# Patient Record
Sex: MALE | Race: WHITE | NOT HISPANIC OR LATINO | Employment: OTHER | URBAN - METROPOLITAN AREA
[De-identification: names, ages, dates, MRNs, and addresses within clinical notes are randomized per-mention and may not be internally consistent; named-entity substitution may affect disease eponyms.]

---

## 2023-06-10 ENCOUNTER — HOSPITAL ENCOUNTER (EMERGENCY)
Facility: HOSPITAL | Age: 63
Discharge: HOME | End: 2023-06-11
Attending: EMERGENCY MEDICINE | Admitting: EMERGENCY MEDICINE
Payer: COMMERCIAL

## 2023-06-10 ENCOUNTER — APPOINTMENT (OUTPATIENT)
Dept: GENERAL RADIOLOGY | Facility: HOSPITAL | Age: 63
End: 2023-06-10
Payer: COMMERCIAL

## 2023-06-10 DIAGNOSIS — L03.115 CELLULITIS OF RIGHT LOWER EXTREMITY: Primary | ICD-10-CM

## 2023-06-10 DIAGNOSIS — Z89.511 S/P BKA (BELOW KNEE AMPUTATION) UNILATERAL, RIGHT: ICD-10-CM

## 2023-06-10 LAB
ALBUMIN SERPL-MCNC: 4.3 G/DL (ref 3.5–5.2)
ALBUMIN/GLOB SERPL: 1.2 G/DL
ALP SERPL-CCNC: 72 U/L (ref 39–117)
ALT SERPL W P-5'-P-CCNC: 14 U/L (ref 1–41)
ANION GAP SERPL CALCULATED.3IONS-SCNC: 10 MMOL/L (ref 5–15)
AST SERPL-CCNC: 13 U/L (ref 1–40)
BASOPHILS # BLD AUTO: 0.03 10*3/MM3 (ref 0–0.2)
BASOPHILS NFR BLD AUTO: 0.2 % (ref 0–1.5)
BILIRUB SERPL-MCNC: 0.5 MG/DL (ref 0–1.2)
BUN SERPL-MCNC: 10 MG/DL (ref 8–23)
BUN/CREAT SERPL: 12.8 (ref 7–25)
CALCIUM SPEC-SCNC: 8.9 MG/DL (ref 8.6–10.5)
CHLORIDE SERPL-SCNC: 97 MMOL/L (ref 98–107)
CO2 SERPL-SCNC: 27 MMOL/L (ref 22–29)
CREAT SERPL-MCNC: 0.78 MG/DL (ref 0.76–1.27)
CRP SERPL-MCNC: 18.86 MG/DL (ref 0–0.5)
D-LACTATE SERPL-SCNC: 1 MMOL/L (ref 0.5–2)
DEPRECATED RDW RBC AUTO: 46.8 FL (ref 37–54)
EGFRCR SERPLBLD CKD-EPI 2021: 100.2 ML/MIN/1.73
EOSINOPHIL # BLD AUTO: 0.03 10*3/MM3 (ref 0–0.4)
EOSINOPHIL NFR BLD AUTO: 0.2 % (ref 0.3–6.2)
ERYTHROCYTE [DISTWIDTH] IN BLOOD BY AUTOMATED COUNT: 14.2 % (ref 12.3–15.4)
ERYTHROCYTE [SEDIMENTATION RATE] IN BLOOD: 19 MM/HR (ref 0–20)
GLOBULIN UR ELPH-MCNC: 3.5 GM/DL
GLUCOSE SERPL-MCNC: 169 MG/DL (ref 65–99)
HCT VFR BLD AUTO: 41 % (ref 37.5–51)
HGB BLD-MCNC: 13.9 G/DL (ref 13–17.7)
IMM GRANULOCYTES # BLD AUTO: 0.05 10*3/MM3 (ref 0–0.05)
IMM GRANULOCYTES NFR BLD AUTO: 0.3 % (ref 0–0.5)
LYMPHOCYTES # BLD AUTO: 2.48 10*3/MM3 (ref 0.7–3.1)
LYMPHOCYTES NFR BLD AUTO: 17.2 % (ref 19.6–45.3)
MCH RBC QN AUTO: 30.6 PG (ref 26.6–33)
MCHC RBC AUTO-ENTMCNC: 33.9 G/DL (ref 31.5–35.7)
MCV RBC AUTO: 90.3 FL (ref 79–97)
MONOCYTES # BLD AUTO: 1.52 10*3/MM3 (ref 0.1–0.9)
MONOCYTES NFR BLD AUTO: 10.6 % (ref 5–12)
NEUTROPHILS NFR BLD AUTO: 10.28 10*3/MM3 (ref 1.7–7)
NEUTROPHILS NFR BLD AUTO: 71.5 % (ref 42.7–76)
NRBC BLD AUTO-RTO: 0 /100 WBC (ref 0–0.2)
PLATELET # BLD AUTO: 146 10*3/MM3 (ref 140–450)
PMV BLD AUTO: 9.4 FL (ref 6–12)
POTASSIUM SERPL-SCNC: 3.2 MMOL/L (ref 3.5–5.2)
PROT SERPL-MCNC: 7.8 G/DL (ref 6–8.5)
RBC # BLD AUTO: 4.54 10*6/MM3 (ref 4.14–5.8)
SODIUM SERPL-SCNC: 134 MMOL/L (ref 136–145)
WBC NRBC COR # BLD: 14.39 10*3/MM3 (ref 3.4–10.8)

## 2023-06-10 PROCEDURE — 73560 X-RAY EXAM OF KNEE 1 OR 2: CPT

## 2023-06-10 PROCEDURE — 80053 COMPREHEN METABOLIC PANEL: CPT | Performed by: EMERGENCY MEDICINE

## 2023-06-10 PROCEDURE — 87040 BLOOD CULTURE FOR BACTERIA: CPT | Performed by: EMERGENCY MEDICINE

## 2023-06-10 PROCEDURE — 96365 THER/PROPH/DIAG IV INF INIT: CPT

## 2023-06-10 PROCEDURE — 83605 ASSAY OF LACTIC ACID: CPT | Performed by: EMERGENCY MEDICINE

## 2023-06-10 PROCEDURE — 85652 RBC SED RATE AUTOMATED: CPT | Performed by: EMERGENCY MEDICINE

## 2023-06-10 PROCEDURE — 25010000002 VANCOMYCIN 10 G RECONSTITUTED SOLUTION: Performed by: EMERGENCY MEDICINE

## 2023-06-10 PROCEDURE — G0378 HOSPITAL OBSERVATION PER HR: HCPCS

## 2023-06-10 PROCEDURE — 99285 EMERGENCY DEPT VISIT HI MDM: CPT

## 2023-06-10 PROCEDURE — 96367 TX/PROPH/DG ADDL SEQ IV INF: CPT

## 2023-06-10 PROCEDURE — 25010000002 CEFTRIAXONE PER 250 MG: Performed by: EMERGENCY MEDICINE

## 2023-06-10 PROCEDURE — 86140 C-REACTIVE PROTEIN: CPT | Performed by: EMERGENCY MEDICINE

## 2023-06-10 PROCEDURE — 36415 COLL VENOUS BLD VENIPUNCTURE: CPT

## 2023-06-10 PROCEDURE — 85025 COMPLETE CBC W/AUTO DIFF WBC: CPT | Performed by: EMERGENCY MEDICINE

## 2023-06-10 RX ORDER — ACETAMINOPHEN 500 MG
1000 TABLET ORAL ONCE
Status: COMPLETED | OUTPATIENT
Start: 2023-06-10 | End: 2023-06-10

## 2023-06-10 RX ADMIN — CEFTRIAXONE 2 G: 2 INJECTION, POWDER, FOR SOLUTION INTRAMUSCULAR; INTRAVENOUS at 23:04

## 2023-06-10 RX ADMIN — ACETAMINOPHEN 1000 MG: 500 TABLET, FILM COATED ORAL at 23:01

## 2023-06-10 RX ADMIN — VANCOMYCIN HYDROCHLORIDE 1500 MG: 10 INJECTION, POWDER, LYOPHILIZED, FOR SOLUTION INTRAVENOUS at 23:43

## 2023-06-10 NOTE — ED NOTES
Mechanical fall 2 days ago, hit R knee/stump on desk during fall. Redness, pain, and swelling. Pt has R BKA. Small wound noted, but looks like it is healing well, no drainage present.

## 2023-06-10 NOTE — Clinical Note
Level of Care: Med/Surg [1]   Diagnosis: Cellulitis of right lower extremity [[id number]]   Admitting Physician: BRUNA HUTCHINSON [[id number]]   Attending Physician: BRUNA HUTCHINSON [[id number]]

## 2023-06-11 VITALS
WEIGHT: 175 LBS | RESPIRATION RATE: 18 BRPM | HEART RATE: 74 BPM | BODY MASS INDEX: 25.05 KG/M2 | SYSTOLIC BLOOD PRESSURE: 107 MMHG | HEIGHT: 70 IN | OXYGEN SATURATION: 100 % | TEMPERATURE: 100 F | DIASTOLIC BLOOD PRESSURE: 78 MMHG

## 2023-06-11 PROCEDURE — G0378 HOSPITAL OBSERVATION PER HR: HCPCS

## 2023-06-11 PROCEDURE — 96366 THER/PROPH/DIAG IV INF ADDON: CPT

## 2023-06-11 RX ORDER — INSULIN DEGLUDEC INJECTION 100 U/ML
INJECTION, SOLUTION SUBCUTANEOUS
COMMUNITY
Start: 2023-03-31

## 2023-06-11 RX ORDER — CYCLOBENZAPRINE HCL 5 MG
5 TABLET ORAL 3 TIMES DAILY PRN
COMMUNITY
Start: 2023-04-19

## 2023-06-11 RX ORDER — AMITRIPTYLINE HYDROCHLORIDE 25 MG/1
25 TABLET, FILM COATED ORAL NIGHTLY PRN
COMMUNITY
Start: 2023-05-16

## 2023-06-11 RX ORDER — ATORVASTATIN CALCIUM 80 MG/1
1 TABLET, FILM COATED ORAL DAILY
COMMUNITY
Start: 2023-05-19

## 2023-06-11 RX ORDER — GLIPIZIDE 10 MG/1
1 TABLET, FILM COATED, EXTENDED RELEASE ORAL DAILY
COMMUNITY
Start: 2023-03-24

## 2023-06-11 RX ORDER — DULAGLUTIDE 1.5 MG/.5ML
INJECTION, SOLUTION SUBCUTANEOUS
COMMUNITY
Start: 2023-04-20

## 2023-06-11 NOTE — ED NOTES
Pt not found in room when checking for hourly rounding. Pt not found in near by restroom. Pt reportedly seen by ED triage staff leaving the department through the main ED entrance. Pt did not inform staff of his departure. Pt presumed to have eloped. Pt did not answer phone call x2. Pt's IV status unknown at time of elopement.

## 2023-06-11 NOTE — ED NOTES
Pt found by ED triage staff out at waiting room vending machines. Pt instructed to return to his room.

## 2023-06-11 NOTE — ED PROVIDER NOTES
EMERGENCY DEPARTMENT ENCOUNTER    Room Number:  33/33  PCP: Junior Murray MD  Historian: Patient      HPI:  Chief Complaint: Right leg pain and swelling  A complete HPI/ROS/PMH/PSH/SH/FH are unobtainable due to: Nothing  Context: Ajit Brandt is a 62 y.o. male who presents to the ED c/o pain and swelling of his right BKA stump.  Patient reports that he fell on Wednesday and thinks that he may have hit it then.  He denies any open wound related to that fall.  Since that he has developed some redness and swelling.  The swelling has caused some skin irritation from his prosthesis.  He denies fever or chills however he was noted to have a fever here on arrival.            PAST MEDICAL HISTORY  Active Ambulatory Problems     Diagnosis Date Noted    No Active Ambulatory Problems     Resolved Ambulatory Problems     Diagnosis Date Noted    No Resolved Ambulatory Problems     No Additional Past Medical History         PAST SURGICAL HISTORY  No past surgical history on file.      FAMILY HISTORY  No family history on file.      SOCIAL HISTORY  Social History     Socioeconomic History    Marital status:          ALLERGIES  Patient has no known allergies.        REVIEW OF SYSTEMS  Review of Systems   Review of all 14 systems is negative other than stated in the HPI above.      PHYSICAL EXAM  ED Triage Vitals   Temp Heart Rate Resp BP SpO2   06/10/23 1933 06/10/23 1931 06/10/23 1931 06/10/23 1936 06/10/23 1931   (!) 100.7 °F (38.2 °C) 112 18 99/73 96 %      Temp src Heart Rate Source Patient Position BP Location FiO2 (%)   06/10/23 1933 -- 06/10/23 1936 06/10/23 1936 --   Tympanic  Sitting Right arm        Physical Exam      GENERAL: Awake and alert, no acute distress  HENT: nares patent  EYES: no scleral icterus, EOMI  CV: regular rhythm, normal rate  RESPIRATORY: normal effort  ABDOMEN: soft, nondistended  MUSCULOSKELETAL: Status post right BKA.  There is erythema, induration, tenderness, warmth particular over  the lateral aspect of the BKA stump.  There is some erythema that tracks proximally.  There are no open wounds.  There is some dried and cracked skin present over the lateral aspect of the BKA stump.  No vesicles or bullae.  No soft tissue crepitus.  NEURO: alert, moves all extremities, follows commands  PSYCH:  calm, cooperative  SKIN: warm, dry, erythema and warmth present over the right BKA stump as described above    Vital signs and nursing notes reviewed.          LAB RESULTS  Recent Results (from the past 24 hour(s))   Lactic Acid, Plasma    Collection Time: 06/10/23  9:41 PM    Specimen: Blood   Result Value Ref Range    Lactate 1.0 0.5 - 2.0 mmol/L   Sedimentation Rate    Collection Time: 06/10/23  9:41 PM    Specimen: Blood   Result Value Ref Range    Sed Rate 19 0 - 20 mm/hr   C-reactive Protein    Collection Time: 06/10/23  9:41 PM    Specimen: Blood   Result Value Ref Range    C-Reactive Protein 18.86 (H) 0.00 - 0.50 mg/dL   Comprehensive Metabolic Panel    Collection Time: 06/10/23  9:41 PM    Specimen: Blood   Result Value Ref Range    Glucose 169 (H) 65 - 99 mg/dL    BUN 10 8 - 23 mg/dL    Creatinine 0.78 0.76 - 1.27 mg/dL    Sodium 134 (L) 136 - 145 mmol/L    Potassium 3.2 (L) 3.5 - 5.2 mmol/L    Chloride 97 (L) 98 - 107 mmol/L    CO2 27.0 22.0 - 29.0 mmol/L    Calcium 8.9 8.6 - 10.5 mg/dL    Total Protein 7.8 6.0 - 8.5 g/dL    Albumin 4.3 3.5 - 5.2 g/dL    ALT (SGPT) 14 1 - 41 U/L    AST (SGOT) 13 1 - 40 U/L    Alkaline Phosphatase 72 39 - 117 U/L    Total Bilirubin 0.5 0.0 - 1.2 mg/dL    Globulin 3.5 gm/dL    A/G Ratio 1.2 g/dL    BUN/Creatinine Ratio 12.8 7.0 - 25.0    Anion Gap 10.0 5.0 - 15.0 mmol/L    eGFR 100.2 >60.0 mL/min/1.73   CBC Auto Differential    Collection Time: 06/10/23  9:41 PM    Specimen: Blood   Result Value Ref Range    WBC 14.39 (H) 3.40 - 10.80 10*3/mm3    RBC 4.54 4.14 - 5.80 10*6/mm3    Hemoglobin 13.9 13.0 - 17.7 g/dL    Hematocrit 41.0 37.5 - 51.0 %    MCV 90.3 79.0 -  97.0 fL    MCH 30.6 26.6 - 33.0 pg    MCHC 33.9 31.5 - 35.7 g/dL    RDW 14.2 12.3 - 15.4 %    RDW-SD 46.8 37.0 - 54.0 fl    MPV 9.4 6.0 - 12.0 fL    Platelets 146 140 - 450 10*3/mm3    Neutrophil % 71.5 42.7 - 76.0 %    Lymphocyte % 17.2 (L) 19.6 - 45.3 %    Monocyte % 10.6 5.0 - 12.0 %    Eosinophil % 0.2 (L) 0.3 - 6.2 %    Basophil % 0.2 0.0 - 1.5 %    Immature Grans % 0.3 0.0 - 0.5 %    Neutrophils, Absolute 10.28 (H) 1.70 - 7.00 10*3/mm3    Lymphocytes, Absolute 2.48 0.70 - 3.10 10*3/mm3    Monocytes, Absolute 1.52 (H) 0.10 - 0.90 10*3/mm3    Eosinophils, Absolute 0.03 0.00 - 0.40 10*3/mm3    Basophils, Absolute 0.03 0.00 - 0.20 10*3/mm3    Immature Grans, Absolute 0.05 0.00 - 0.05 10*3/mm3    nRBC 0.0 0.0 - 0.2 /100 WBC       Ordered the above labs and reviewed the results.        RADIOLOGY  XR Knee 1 or 2 View Right    Result Date: 6/10/2023  2 VIEWS RIGHT KNEE  HISTORY: Right below knee amputation site redness  COMPARISON: None available.  FINDINGS: The patient is status post right below knee amputation. No definite aggressive osseous abnormalities are seen to suggest osteomyelitis. No acute fracture or subluxation is seen. There is no supra patellar effusion. There are dense vascular calcifications.      No acute osseous findings.  This report was finalized on 6/10/2023 9:56 PM by Dr. Carolyn Alan M.D.       Ordered the above noted radiological studies. Reviewed by me in PACS.            PROCEDURES  Procedures              MEDICATIONS GIVEN IN ER  Medications   acetaminophen (TYLENOL) tablet 1,000 mg (1,000 mg Oral Given 6/10/23 2301)   cefTRIAXone (ROCEPHIN) 2 g in sodium chloride 0.9 % 100 mL IVPB-VTB (0 g Intravenous Stopped 6/10/23 7723)   vancomycin 1500 mg/500 mL 0.9% NS IVPB ([facility]) (0 mg Intravenous Stopped 6/11/23 0141)                   MEDICAL DECISION MAKING, PROGRESS, and CONSULTS    All labs have been independently reviewed by me.  All radiology studies have been reviewed by me and I have  also reviewed the radiology report.   EKG's independently viewed and interpreted by me.  Discussion below represents my analysis of pertinent findings related to patient's condition, differential diagnosis, treatment plan and final disposition.      Additional sources:  - Discussed/ obtained information from independent historians: N/A    - External (non-ED) record review: N/A    - Chronic or social conditions impacting care: N/A    - Shared decision making: Patient informed of plan for admission for IV antibiotics due to right BKA stump cellulitis.      Orders placed during this visit:  Orders Placed This Encounter   Procedures    Blood Culture - Blood,    Blood Culture - Blood,    XR Knee 1 or 2 View Right    Lactic Acid, Plasma    Sedimentation Rate    C-reactive Protein    Comprehensive Metabolic Panel    CBC Auto Differential    Initiate Observation Status    CBC & Differential           Differential diagnosis includes but is not limited to:    Cellulitis  Osteomyelitis  Sepsis        Independent interpretation of labs, radiology studies, and discussions with consultants:  ED Course as of 06/11/23 0319   Sat Sergei 10, 2023   2210 C-Reactive Protein(!): 18.86 [JR]   2210 Lactate: 1.0 [JR]   2210 Sed Rate: 19 [JR]   2210 Plain films of the right knee independently interpreted in PACS.  Patient is status post right BKA.  There is no evidence of acute fracture. [JR]   2216 WBC(!): 14.39 [JR]   2250 Sodium(!): 134 [JR]   2250 Potassium(!): 3.2 [JR]   2323 Discussed with Dr. Overton who agrees to admit patient for further management of right lower extremity cellulitis. [JR]      ED Course User Index  [JR] Jorge Sanchez MD                 DIAGNOSIS  Final diagnoses:   Cellulitis of right lower extremity   S/P BKA (below knee amputation) unilateral, right         DISPOSITION  Admit            Latest Documented Vital Signs:  As of 03:19 EDT  BP- 107/78 HR- 74 Temp- 100 °F (37.8 °C) (Oral) O2 sat- 100%               --    Please note that portions of this were completed with a voice recognition program.       Note Disclaimer: At [facility], we believe that sharing information builds trust and better relationships. You are receiving this note because you are receiving care at [facility] or recently visited. It is possible you will see health information before a provider has talked with you about it. This kind of information can be easy to misunderstand. To help you fully understand what it means for your health, we urge you to discuss this note with your provider.             Jorge Sanchez MD  06/11/23 5029

## 2023-06-15 LAB
BACTERIA SPEC AEROBE CULT: NORMAL
BACTERIA SPEC AEROBE CULT: NORMAL

## 2024-08-20 ENCOUNTER — APPOINTMENT (OUTPATIENT)
Dept: GENERAL RADIOLOGY | Facility: HOSPITAL | Age: 64
End: 2024-08-20
Payer: COMMERCIAL

## 2024-08-20 ENCOUNTER — HOSPITAL ENCOUNTER (OUTPATIENT)
Facility: HOSPITAL | Age: 64
Setting detail: OBSERVATION
Discharge: HOME | End: 2024-08-22
Attending: EMERGENCY MEDICINE | Admitting: EMERGENCY MEDICINE
Payer: COMMERCIAL

## 2024-08-20 DIAGNOSIS — I25.10 CORONARY ARTERY DISEASE INVOLVING NATIVE CORONARY ARTERY OF NATIVE HEART, UNSPECIFIED WHETHER ANGINA PRESENT: ICD-10-CM

## 2024-08-20 DIAGNOSIS — R07.9 CHEST PAIN, UNSPECIFIED TYPE: Primary | ICD-10-CM

## 2024-08-20 DIAGNOSIS — E11.65 UNCONTROLLED TYPE 2 DIABETES MELLITUS WITH HYPERGLYCEMIA: ICD-10-CM

## 2024-08-20 PROCEDURE — 85025 COMPLETE CBC W/AUTO DIFF WBC: CPT | Performed by: EMERGENCY MEDICINE

## 2024-08-20 PROCEDURE — 93005 ELECTROCARDIOGRAM TRACING: CPT | Performed by: EMERGENCY MEDICINE

## 2024-08-20 PROCEDURE — 80053 COMPREHEN METABOLIC PANEL: CPT | Performed by: EMERGENCY MEDICINE

## 2024-08-20 PROCEDURE — 71045 X-RAY EXAM CHEST 1 VIEW: CPT

## 2024-08-20 PROCEDURE — 84484 ASSAY OF TROPONIN QUANT: CPT | Performed by: EMERGENCY MEDICINE

## 2024-08-20 PROCEDURE — 83880 ASSAY OF NATRIURETIC PEPTIDE: CPT | Performed by: EMERGENCY MEDICINE

## 2024-08-20 PROCEDURE — 93010 ELECTROCARDIOGRAM REPORT: CPT | Performed by: INTERNAL MEDICINE

## 2024-08-20 RX ORDER — SODIUM CHLORIDE 0.9 % (FLUSH) 0.9 %
10 SYRINGE (ML) INJECTION AS NEEDED
Status: DISCONTINUED | OUTPATIENT
Start: 2024-08-20 | End: 2024-08-22 | Stop reason: HOSPADM

## 2024-08-20 NOTE — LETTER
[facility]  [phone]    ACCESS CENTER STATEMENT OF DISPOSITION    I, Ajit Brandt, was assessed in the [facility] at [facility] on 8/22/2024.  I understand the recommendations below and what follow-up action is expected of me.    [facility]  [phone]    [facility]  [phone]    [facility].  [phone]  [email or web address]    [facility]rief Therapists  www.firsthourgrief.org  *under “Resources” - Counseling and Support Groups    Grief Share  www.griefshare.org (can locate grief groups in [place] and other info)    Jona Elam  (in person and online counseling)  [address] [facility] [address] [facility] [address], [place] [address] [phone]    Gigi Lara LCSW (in person and online counseling)  [phone]    ________________________________  Patient/Parent/Guardian/POA Signature    ________________________________    8/22/2024  09:10 EDT

## 2024-08-21 ENCOUNTER — APPOINTMENT (OUTPATIENT)
Dept: NUCLEAR MEDICINE | Facility: HOSPITAL | Age: 64
End: 2024-08-21
Payer: COMMERCIAL

## 2024-08-21 ENCOUNTER — APPOINTMENT (OUTPATIENT)
Dept: CARDIOLOGY | Facility: HOSPITAL | Age: 64
End: 2024-08-21
Payer: COMMERCIAL

## 2024-08-21 PROBLEM — R07.9 CHEST PAIN: Status: ACTIVE | Noted: 2024-08-21

## 2024-08-21 LAB
ALBUMIN SERPL-MCNC: 3.6 G/DL (ref 3.5–5.2)
ALBUMIN SERPL-MCNC: 3.9 G/DL (ref 3.5–5.2)
ALBUMIN/GLOB SERPL: 1.3 G/DL
ALBUMIN/GLOB SERPL: 1.6 G/DL
ALP SERPL-CCNC: 73 U/L (ref 39–117)
ALP SERPL-CCNC: 77 U/L (ref 39–117)
ALT SERPL W P-5'-P-CCNC: 10 U/L (ref 1–41)
ALT SERPL W P-5'-P-CCNC: 12 U/L (ref 1–41)
AMPHET+METHAMPHET UR QL: NEGATIVE
ANION GAP SERPL CALCULATED.3IONS-SCNC: 7 MMOL/L (ref 5–15)
ANION GAP SERPL CALCULATED.3IONS-SCNC: 7.9 MMOL/L (ref 5–15)
AORTIC DIMENSIONLESS INDEX: 0.4 (DI)
AST SERPL-CCNC: 11 U/L (ref 1–40)
AST SERPL-CCNC: 13 U/L (ref 1–40)
BARBITURATES UR QL SCN: NEGATIVE
BASOPHILS # BLD AUTO: 0.06 10*3/MM3 (ref 0–0.2)
BASOPHILS NFR BLD AUTO: 0.7 % (ref 0–1.5)
BENZODIAZ UR QL SCN: NEGATIVE
BH CV ECHO MEAS - AO MAX PG: 18.9 MMHG
BH CV ECHO MEAS - AO MEAN PG: 14.9 MMHG
BH CV ECHO MEAS - AO ROOT DIAM: 3.7 CM
BH CV ECHO MEAS - AO V2 MAX: 217.7 CM/SEC
BH CV ECHO MEAS - AO V2 VTI: 48.7 CM
BH CV ECHO MEAS - AVA(I,D): 0.97 CM2
BH CV ECHO MEAS - EDV(CUBED): 84.4 ML
BH CV ECHO MEAS - EDV(MOD-SP2): 74 ML
BH CV ECHO MEAS - EDV(MOD-SP4): 67 ML
BH CV ECHO MEAS - EF(MOD-BP): 58.8 %
BH CV ECHO MEAS - EF(MOD-SP2): 58.1 %
BH CV ECHO MEAS - EF(MOD-SP4): 59.7 %
BH CV ECHO MEAS - ESV(CUBED): 18.5 ML
BH CV ECHO MEAS - ESV(MOD-SP2): 31 ML
BH CV ECHO MEAS - ESV(MOD-SP4): 27 ML
BH CV ECHO MEAS - FS: 39.7 %
BH CV ECHO MEAS - IVS/LVPW: 0.95 CM
BH CV ECHO MEAS - IVSD: 0.69 CM
BH CV ECHO MEAS - LAT PEAK E' VEL: 8.6 CM/SEC
BH CV ECHO MEAS - LV DIASTOLIC VOL/BSA (35-75): 37 CM2
BH CV ECHO MEAS - LV MASS(C)D: 92.8 GRAMS
BH CV ECHO MEAS - LV MAX PG: 2.32 MMHG
BH CV ECHO MEAS - LV MEAN PG: 1.28 MMHG
BH CV ECHO MEAS - LV SYSTOLIC VOL/BSA (12-30): 14.9 CM2
BH CV ECHO MEAS - LV V1 MAX: 76.2 CM/SEC
BH CV ECHO MEAS - LV V1 VTI: 14.9 CM
BH CV ECHO MEAS - LVIDD: 4.4 CM
BH CV ECHO MEAS - LVIDS: 2.6 CM
BH CV ECHO MEAS - LVOT AREA: 3.2 CM2
BH CV ECHO MEAS - LVOT DIAM: 2.01 CM
BH CV ECHO MEAS - LVPWD: 0.73 CM
BH CV ECHO MEAS - MED PEAK E' VEL: 6.9 CM/SEC
BH CV ECHO MEAS - MV A DUR: 0.11 SEC
BH CV ECHO MEAS - MV A MAX VEL: 76 CM/SEC
BH CV ECHO MEAS - MV DEC SLOPE: 297.6 CM/SEC2
BH CV ECHO MEAS - MV DEC TIME: 0.16 SEC
BH CV ECHO MEAS - MV E MAX VEL: 60.8 CM/SEC
BH CV ECHO MEAS - MV E/A: 0.8
BH CV ECHO MEAS - MV MAX PG: 3 MMHG
BH CV ECHO MEAS - MV MEAN PG: 1.04 MMHG
BH CV ECHO MEAS - MV P1/2T: 70.2 MSEC
BH CV ECHO MEAS - MV V2 VTI: 22.2 CM
BH CV ECHO MEAS - MVA(P1/2T): 3.1 CM2
BH CV ECHO MEAS - MVA(VTI): 2.13 CM2
BH CV ECHO MEAS - PA ACC TIME: 0.13 SEC
BH CV ECHO MEAS - PA V2 MAX: 98.4 CM/SEC
BH CV ECHO MEAS - PULM A REVS DUR: 0.14 SEC
BH CV ECHO MEAS - PULM A REVS VEL: 27.1 CM/SEC
BH CV ECHO MEAS - PULM DIAS VEL: 38.7 CM/SEC
BH CV ECHO MEAS - PULM S/D: 1.31
BH CV ECHO MEAS - PULM SYS VEL: 50.8 CM/SEC
BH CV ECHO MEAS - RAP SYSTOLE: 3 MMHG
BH CV ECHO MEAS - RV MAX PG: 3.9 MMHG
BH CV ECHO MEAS - RV V1 MAX: 98.5 CM/SEC
BH CV ECHO MEAS - RV V1 VTI: 16.2 CM
BH CV ECHO MEAS - RVSP: 15.3 MMHG
BH CV ECHO MEAS - SV(LVOT): 47.2 ML
BH CV ECHO MEAS - SV(MOD-SP2): 43 ML
BH CV ECHO MEAS - SV(MOD-SP4): 40 ML
BH CV ECHO MEAS - SVI(LVOT): 26.1 ML/M2
BH CV ECHO MEAS - SVI(MOD-SP2): 23.8 ML/M2
BH CV ECHO MEAS - SVI(MOD-SP4): 22.1 ML/M2
BH CV ECHO MEAS - TAPSE (>1.6): 2.5 CM
BH CV ECHO MEAS - TR MAX PG: 12.3 MMHG
BH CV ECHO MEAS - TR MAX VEL: 175.4 CM/SEC
BH CV ECHO MEASUREMENTS AVERAGE E/E' RATIO: 7.85
BH CV REST NUCLEAR ISOTOPE DOSE: 10.2 MCI
BH CV STRESS COMMENTS STAGE 1: NORMAL
BH CV STRESS DOSE REGADENOSON STAGE 1: 0.4
BH CV STRESS DURATION MIN STAGE 1: 0
BH CV STRESS DURATION SEC STAGE 1: 10
BH CV STRESS NUCLEAR ISOTOPE DOSE: 29.4 MCI
BH CV STRESS PROTOCOL 1: NORMAL
BH CV STRESS RECOVERY BP: NORMAL MMHG
BH CV STRESS RECOVERY HR: 83 BPM
BH CV STRESS STAGE 1: 1
BH CV XLRA - RV BASE: 3.5 CM
BH CV XLRA - RV LENGTH: 8 CM
BH CV XLRA - RV MID: 3.1 CM
BH CV XLRA - TDI S': 12.3 CM/SEC
BILIRUB SERPL-MCNC: 0.2 MG/DL (ref 0–1.2)
BILIRUB SERPL-MCNC: <0.2 MG/DL (ref 0–1.2)
BILIRUB UR QL STRIP: NEGATIVE
BUN SERPL-MCNC: 17 MG/DL (ref 8–23)
BUN SERPL-MCNC: 20 MG/DL (ref 8–23)
BUN/CREAT SERPL: 26.2 (ref 7–25)
BUN/CREAT SERPL: 30.3 (ref 7–25)
CALCIUM SPEC-SCNC: 9.3 MG/DL (ref 8.6–10.5)
CALCIUM SPEC-SCNC: 9.9 MG/DL (ref 8.6–10.5)
CANNABINOIDS SERPL QL: POSITIVE
CHLORIDE SERPL-SCNC: 95 MMOL/L (ref 98–107)
CHLORIDE SERPL-SCNC: 96 MMOL/L (ref 98–107)
CHOLEST SERPL-MCNC: 114 MG/DL (ref 0–200)
CLARITY UR: CLEAR
CO2 SERPL-SCNC: 31.1 MMOL/L (ref 22–29)
CO2 SERPL-SCNC: 33 MMOL/L (ref 22–29)
COCAINE UR QL: POSITIVE
COLOR UR: YELLOW
CREAT SERPL-MCNC: 0.65 MG/DL (ref 0.76–1.27)
CREAT SERPL-MCNC: 0.66 MG/DL (ref 0.76–1.27)
DEPRECATED RDW RBC AUTO: 45.9 FL (ref 37–54)
DEPRECATED RDW RBC AUTO: 46 FL (ref 37–54)
EGFRCR SERPLBLD CKD-EPI 2021: 104.7 ML/MIN/1.73
EGFRCR SERPLBLD CKD-EPI 2021: 105.2 ML/MIN/1.73
EOSINOPHIL # BLD AUTO: 0.18 10*3/MM3 (ref 0–0.4)
EOSINOPHIL NFR BLD AUTO: 2.2 % (ref 0.3–6.2)
ERYTHROCYTE [DISTWIDTH] IN BLOOD BY AUTOMATED COUNT: 13.7 % (ref 12.3–15.4)
ERYTHROCYTE [DISTWIDTH] IN BLOOD BY AUTOMATED COUNT: 13.9 % (ref 12.3–15.4)
ETHANOL BLD-MCNC: <10 MG/DL (ref 0–10)
ETHANOL UR QL: <0.01 %
FENTANYL UR-MCNC: POSITIVE NG/ML
GEN 5 2HR TROPONIN T REFLEX: <6 NG/L
GLOBULIN UR ELPH-MCNC: 2.5 GM/DL
GLOBULIN UR ELPH-MCNC: 2.7 GM/DL
GLUCOSE BLDC GLUCOMTR-MCNC: 226 MG/DL (ref 70–130)
GLUCOSE BLDC GLUCOMTR-MCNC: 97 MG/DL (ref 70–130)
GLUCOSE SERPL-MCNC: 220 MG/DL (ref 65–99)
GLUCOSE SERPL-MCNC: 249 MG/DL (ref 65–99)
GLUCOSE UR STRIP-MCNC: ABNORMAL MG/DL
HBA1C MFR BLD: 7.6 % (ref 4.8–5.6)
HCT VFR BLD AUTO: 39.2 % (ref 37.5–51)
HCT VFR BLD AUTO: 39.6 % (ref 37.5–51)
HDLC SERPL-MCNC: 47 MG/DL (ref 40–60)
HGB BLD-MCNC: 13.1 G/DL (ref 13–17.7)
HGB BLD-MCNC: 13.1 G/DL (ref 13–17.7)
HGB UR QL STRIP.AUTO: NEGATIVE
IMM GRANULOCYTES # BLD AUTO: 0.03 10*3/MM3 (ref 0–0.05)
IMM GRANULOCYTES NFR BLD AUTO: 0.4 % (ref 0–0.5)
KETONES UR QL STRIP: NEGATIVE
LDLC SERPL CALC-MCNC: 41 MG/DL (ref 0–100)
LDLC/HDLC SERPL: 0.77 {RATIO}
LEUKOCYTE ESTERASE UR QL STRIP.AUTO: NEGATIVE
LIPASE SERPL-CCNC: 22 U/L (ref 13–60)
LV EF NUC BP: 58 %
LYMPHOCYTES # BLD AUTO: 2.52 10*3/MM3 (ref 0.7–3.1)
LYMPHOCYTES NFR BLD AUTO: 31.2 % (ref 19.6–45.3)
MAXIMAL PREDICTED HEART RATE: 156 BPM
MCH RBC QN AUTO: 30 PG (ref 26.6–33)
MCH RBC QN AUTO: 30.2 PG (ref 26.6–33)
MCHC RBC AUTO-ENTMCNC: 33.1 G/DL (ref 31.5–35.7)
MCHC RBC AUTO-ENTMCNC: 33.4 G/DL (ref 31.5–35.7)
MCV RBC AUTO: 90.3 FL (ref 79–97)
MCV RBC AUTO: 90.6 FL (ref 79–97)
METHADONE UR QL SCN: NEGATIVE
MONOCYTES # BLD AUTO: 0.74 10*3/MM3 (ref 0.1–0.9)
MONOCYTES NFR BLD AUTO: 9.2 % (ref 5–12)
NEUTROPHILS NFR BLD AUTO: 4.54 10*3/MM3 (ref 1.7–7)
NEUTROPHILS NFR BLD AUTO: 56.3 % (ref 42.7–76)
NITRITE UR QL STRIP: NEGATIVE
NRBC BLD AUTO-RTO: 0 /100 WBC (ref 0–0.2)
NT-PROBNP SERPL-MCNC: 111 PG/ML (ref 0–900)
OPIATES UR QL: NEGATIVE
OXYCODONE UR QL SCN: NEGATIVE
PERCENT MAX PREDICTED HR: 58.97 %
PH UR STRIP.AUTO: 7 [PH] (ref 5–8)
PLATELET # BLD AUTO: 195 10*3/MM3 (ref 140–450)
PLATELET # BLD AUTO: 222 10*3/MM3 (ref 140–450)
PMV BLD AUTO: 8.3 FL (ref 6–12)
PMV BLD AUTO: 8.8 FL (ref 6–12)
POTASSIUM SERPL-SCNC: 3.9 MMOL/L (ref 3.5–5.2)
POTASSIUM SERPL-SCNC: 4.6 MMOL/L (ref 3.5–5.2)
PROT SERPL-MCNC: 6.3 G/DL (ref 6–8.5)
PROT SERPL-MCNC: 6.4 G/DL (ref 6–8.5)
PROT UR QL STRIP: NEGATIVE
QT INTERVAL: 381 MS
QT INTERVAL: 386 MS
QT INTERVAL: 392 MS
QT INTERVAL: 407 MS
QTC INTERVAL: 423 MS
QTC INTERVAL: 432 MS
QTC INTERVAL: 433 MS
QTC INTERVAL: 435 MS
RBC # BLD AUTO: 4.34 10*6/MM3 (ref 4.14–5.8)
RBC # BLD AUTO: 4.37 10*6/MM3 (ref 4.14–5.8)
SINUS: 3.3 CM
SODIUM SERPL-SCNC: 134 MMOL/L (ref 136–145)
SODIUM SERPL-SCNC: 136 MMOL/L (ref 136–145)
SP GR UR STRIP: 1.01 (ref 1–1.03)
STJ: 3.1 CM
STRESS BASELINE BP: NORMAL MMHG
STRESS BASELINE HR: 66 BPM
STRESS PERCENT HR: 69 %
STRESS POST PEAK BP: NORMAL MMHG
STRESS POST PEAK HR: 92 BPM
STRESS TARGET HR: 133 BPM
TRIGL SERPL-MCNC: 155 MG/DL (ref 0–150)
TROPONIN T DELTA: NORMAL
TROPONIN T SERPL HS-MCNC: 8 NG/L
TROPONIN T SERPL HS-MCNC: <6 NG/L
UROBILINOGEN UR QL STRIP: ABNORMAL
VLDLC SERPL-MCNC: 26 MG/DL (ref 5–40)
WBC NRBC COR # BLD AUTO: 8.07 10*3/MM3 (ref 3.4–10.8)
WBC NRBC COR # BLD AUTO: 8.38 10*3/MM3 (ref 3.4–10.8)

## 2024-08-21 PROCEDURE — G0378 HOSPITAL OBSERVATION PER HR: HCPCS

## 2024-08-21 PROCEDURE — 93306 TTE W/DOPPLER COMPLETE: CPT | Performed by: INTERNAL MEDICINE

## 2024-08-21 PROCEDURE — 80053 COMPREHEN METABOLIC PANEL: CPT

## 2024-08-21 PROCEDURE — 99285 EMERGENCY DEPT VISIT HI MDM: CPT

## 2024-08-21 PROCEDURE — 93010 ELECTROCARDIOGRAM REPORT: CPT | Performed by: INTERNAL MEDICINE

## 2024-08-21 PROCEDURE — 63710000001 INSULIN GLARGINE PER 5 UNITS

## 2024-08-21 PROCEDURE — 85027 COMPLETE CBC AUTOMATED: CPT

## 2024-08-21 PROCEDURE — 80307 DRUG TEST PRSMV CHEM ANLYZR: CPT

## 2024-08-21 PROCEDURE — 93016 CV STRESS TEST SUPVJ ONLY: CPT | Performed by: STUDENT IN AN ORGANIZED HEALTH CARE EDUCATION/TRAINING PROGRAM

## 2024-08-21 PROCEDURE — 78452 HT MUSCLE IMAGE SPECT MULT: CPT

## 2024-08-21 PROCEDURE — 93306 TTE W/DOPPLER COMPLETE: CPT

## 2024-08-21 PROCEDURE — 93017 CV STRESS TEST TRACING ONLY: CPT

## 2024-08-21 PROCEDURE — 83690 ASSAY OF LIPASE: CPT | Performed by: NURSE PRACTITIONER

## 2024-08-21 PROCEDURE — 83036 HEMOGLOBIN GLYCOSYLATED A1C: CPT

## 2024-08-21 PROCEDURE — 81003 URINALYSIS AUTO W/O SCOPE: CPT

## 2024-08-21 PROCEDURE — 96374 THER/PROPH/DIAG INJ IV PUSH: CPT

## 2024-08-21 PROCEDURE — 25010000002 REGADENOSON 0.4 MG/5ML SOLUTION: Performed by: EMERGENCY MEDICINE

## 2024-08-21 PROCEDURE — 84484 ASSAY OF TROPONIN QUANT: CPT | Performed by: EMERGENCY MEDICINE

## 2024-08-21 PROCEDURE — 84484 ASSAY OF TROPONIN QUANT: CPT

## 2024-08-21 PROCEDURE — 93005 ELECTROCARDIOGRAM TRACING: CPT

## 2024-08-21 PROCEDURE — 63710000001 INSULIN REGULAR HUMAN PER 5 UNITS

## 2024-08-21 PROCEDURE — 82948 REAGENT STRIP/BLOOD GLUCOSE: CPT

## 2024-08-21 PROCEDURE — 36415 COLL VENOUS BLD VENIPUNCTURE: CPT

## 2024-08-21 PROCEDURE — 0 TECHNETIUM TETROFOSMIN KIT: Performed by: EMERGENCY MEDICINE

## 2024-08-21 PROCEDURE — 80061 LIPID PANEL: CPT

## 2024-08-21 PROCEDURE — A9502 TC99M TETROFOSMIN: HCPCS | Performed by: EMERGENCY MEDICINE

## 2024-08-21 PROCEDURE — 82077 ASSAY SPEC XCP UR&BREATH IA: CPT

## 2024-08-21 PROCEDURE — 93018 CV STRESS TEST I&R ONLY: CPT | Performed by: STUDENT IN AN ORGANIZED HEALTH CARE EDUCATION/TRAINING PROGRAM

## 2024-08-21 PROCEDURE — 99204 OFFICE O/P NEW MOD 45 MIN: CPT | Performed by: STUDENT IN AN ORGANIZED HEALTH CARE EDUCATION/TRAINING PROGRAM

## 2024-08-21 PROCEDURE — 78452 HT MUSCLE IMAGE SPECT MULT: CPT | Performed by: STUDENT IN AN ORGANIZED HEALTH CARE EDUCATION/TRAINING PROGRAM

## 2024-08-21 RX ORDER — GLIPIZIDE 5 MG/1
5 TABLET ORAL
Status: DISCONTINUED | OUTPATIENT
Start: 2024-08-21 | End: 2024-08-22 | Stop reason: HOSPADM

## 2024-08-21 RX ORDER — ACETAMINOPHEN 500 MG
1000 TABLET ORAL EVERY 8 HOURS PRN
Status: DISCONTINUED | OUTPATIENT
Start: 2024-08-21 | End: 2024-08-22 | Stop reason: HOSPADM

## 2024-08-21 RX ORDER — IBUPROFEN 600 MG/1
1 TABLET ORAL
Status: DISCONTINUED | OUTPATIENT
Start: 2024-08-21 | End: 2024-08-22 | Stop reason: HOSPADM

## 2024-08-21 RX ORDER — PANTOPRAZOLE SODIUM 40 MG/10ML
40 INJECTION, POWDER, LYOPHILIZED, FOR SOLUTION INTRAVENOUS DAILY
Status: DISCONTINUED | OUTPATIENT
Start: 2024-08-21 | End: 2024-08-22 | Stop reason: HOSPADM

## 2024-08-21 RX ORDER — ALBUTEROL SULFATE 90 UG/1
2 AEROSOL, METERED RESPIRATORY (INHALATION)
COMMUNITY
Start: 2024-07-08

## 2024-08-21 RX ORDER — SODIUM CHLORIDE 0.9 % (FLUSH) 0.9 %
10 SYRINGE (ML) INJECTION AS NEEDED
Status: DISCONTINUED | OUTPATIENT
Start: 2024-08-21 | End: 2024-08-22 | Stop reason: HOSPADM

## 2024-08-21 RX ORDER — DEXTROSE MONOHYDRATE 25 G/50ML
25 INJECTION, SOLUTION INTRAVENOUS
Status: DISCONTINUED | OUTPATIENT
Start: 2024-08-21 | End: 2024-08-22 | Stop reason: HOSPADM

## 2024-08-21 RX ORDER — CILOSTAZOL 100 MG/1
100 TABLET ORAL 2 TIMES DAILY
Status: DISCONTINUED | OUTPATIENT
Start: 2024-08-21 | End: 2024-08-22 | Stop reason: HOSPADM

## 2024-08-21 RX ORDER — CLOPIDOGREL BISULFATE 75 MG/1
75 TABLET ORAL DAILY
COMMUNITY
Start: 2024-06-07

## 2024-08-21 RX ORDER — REGADENOSON 0.08 MG/ML
0.4 INJECTION, SOLUTION INTRAVENOUS
Status: COMPLETED | OUTPATIENT
Start: 2024-08-21 | End: 2024-08-21

## 2024-08-21 RX ORDER — NICOTINE 21 MG/24HR
1 PATCH, TRANSDERMAL 24 HOURS TRANSDERMAL NIGHTLY
Status: DISCONTINUED | OUTPATIENT
Start: 2024-08-21 | End: 2024-08-22 | Stop reason: HOSPADM

## 2024-08-21 RX ORDER — ATORVASTATIN CALCIUM 80 MG/1
80 TABLET, FILM COATED ORAL DAILY
Status: DISCONTINUED | OUTPATIENT
Start: 2024-08-21 | End: 2024-08-22 | Stop reason: HOSPADM

## 2024-08-21 RX ORDER — NITROGLYCERIN 0.4 MG/1
0.4 TABLET SUBLINGUAL
Status: DISCONTINUED | OUTPATIENT
Start: 2024-08-21 | End: 2024-08-22 | Stop reason: HOSPADM

## 2024-08-21 RX ORDER — POLYETHYLENE GLYCOL 3350 17 G/17G
17 POWDER, FOR SOLUTION ORAL DAILY PRN
Status: DISCONTINUED | OUTPATIENT
Start: 2024-08-21 | End: 2024-08-22 | Stop reason: HOSPADM

## 2024-08-21 RX ORDER — SODIUM CHLORIDE 0.9 % (FLUSH) 0.9 %
10 SYRINGE (ML) INJECTION EVERY 12 HOURS SCHEDULED
Status: DISCONTINUED | OUTPATIENT
Start: 2024-08-21 | End: 2024-08-22 | Stop reason: HOSPADM

## 2024-08-21 RX ORDER — BISACODYL 5 MG/1
5 TABLET, DELAYED RELEASE ORAL DAILY PRN
Status: DISCONTINUED | OUTPATIENT
Start: 2024-08-21 | End: 2024-08-22 | Stop reason: HOSPADM

## 2024-08-21 RX ORDER — ALUMINA, MAGNESIA, AND SIMETHICONE 2400; 2400; 240 MG/30ML; MG/30ML; MG/30ML
15 SUSPENSION ORAL ONCE
Status: COMPLETED | OUTPATIENT
Start: 2024-08-21 | End: 2024-08-21

## 2024-08-21 RX ORDER — SODIUM CHLORIDE 9 MG/ML
40 INJECTION, SOLUTION INTRAVENOUS AS NEEDED
Status: DISCONTINUED | OUTPATIENT
Start: 2024-08-21 | End: 2024-08-22 | Stop reason: HOSPADM

## 2024-08-21 RX ORDER — LISINOPRIL 10 MG/1
10 TABLET ORAL DAILY
COMMUNITY
Start: 2024-07-08 | End: 2025-07-08

## 2024-08-21 RX ORDER — BISACODYL 10 MG
10 SUPPOSITORY, RECTAL RECTAL DAILY PRN
Status: DISCONTINUED | OUTPATIENT
Start: 2024-08-21 | End: 2024-08-22 | Stop reason: HOSPADM

## 2024-08-21 RX ORDER — TAMSULOSIN HYDROCHLORIDE 0.4 MG/1
0.4 CAPSULE ORAL DAILY
Status: DISCONTINUED | OUTPATIENT
Start: 2024-08-21 | End: 2024-08-22 | Stop reason: HOSPADM

## 2024-08-21 RX ORDER — CILOSTAZOL 100 MG/1
100 TABLET ORAL
COMMUNITY
Start: 2024-06-07

## 2024-08-21 RX ORDER — AMOXICILLIN 250 MG
2 CAPSULE ORAL 2 TIMES DAILY PRN
Status: DISCONTINUED | OUTPATIENT
Start: 2024-08-21 | End: 2024-08-22 | Stop reason: HOSPADM

## 2024-08-21 RX ORDER — CYCLOBENZAPRINE HCL 10 MG
5 TABLET ORAL 3 TIMES DAILY PRN
Status: DISCONTINUED | OUTPATIENT
Start: 2024-08-21 | End: 2024-08-22 | Stop reason: HOSPADM

## 2024-08-21 RX ORDER — NICOTINE POLACRILEX 4 MG
15 LOZENGE BUCCAL
Status: DISCONTINUED | OUTPATIENT
Start: 2024-08-21 | End: 2024-08-22 | Stop reason: HOSPADM

## 2024-08-21 RX ORDER — CLOPIDOGREL BISULFATE 75 MG/1
75 TABLET ORAL DAILY
Status: DISCONTINUED | OUTPATIENT
Start: 2024-08-21 | End: 2024-08-22 | Stop reason: HOSPADM

## 2024-08-21 RX ORDER — SERTRALINE HYDROCHLORIDE 25 MG/1
1 TABLET, FILM COATED ORAL DAILY
COMMUNITY
Start: 2024-06-07

## 2024-08-21 RX ORDER — LISINOPRIL 10 MG/1
10 TABLET ORAL DAILY
Status: DISCONTINUED | OUTPATIENT
Start: 2024-08-21 | End: 2024-08-22 | Stop reason: HOSPADM

## 2024-08-21 RX ADMIN — TETROFOSMIN 1 DOSE: 1.38 INJECTION, POWDER, LYOPHILIZED, FOR SOLUTION INTRAVENOUS at 11:10

## 2024-08-21 RX ADMIN — LISINOPRIL 10 MG: 10 TABLET ORAL at 09:26

## 2024-08-21 RX ADMIN — TAMSULOSIN HYDROCHLORIDE 0.4 MG: 0.4 CAPSULE ORAL at 09:26

## 2024-08-21 RX ADMIN — Medication 10 ML: at 20:48

## 2024-08-21 RX ADMIN — Medication 10 ML: at 05:20

## 2024-08-21 RX ADMIN — SERTRALINE 25 MG: 50 TABLET, FILM COATED ORAL at 09:26

## 2024-08-21 RX ADMIN — CLOPIDOGREL BISULFATE 75 MG: 75 TABLET, FILM COATED ORAL at 09:26

## 2024-08-21 RX ADMIN — Medication 10 ML: at 09:36

## 2024-08-21 RX ADMIN — INSULIN HUMAN 3 UNITS: 100 INJECTION, SOLUTION PARENTERAL at 06:37

## 2024-08-21 RX ADMIN — TETROFOSMIN 1 DOSE: 1.38 INJECTION, POWDER, LYOPHILIZED, FOR SOLUTION INTRAVENOUS at 08:18

## 2024-08-21 RX ADMIN — CILOSTAZOL 100 MG: 100 TABLET ORAL at 20:47

## 2024-08-21 RX ADMIN — INSULIN GLARGINE 20 UNITS: 100 INJECTION, SOLUTION SUBCUTANEOUS at 09:26

## 2024-08-21 RX ADMIN — PANTOPRAZOLE SODIUM 40 MG: 40 INJECTION, POWDER, FOR SOLUTION INTRAVENOUS at 09:26

## 2024-08-21 RX ADMIN — ALUMINUM HYDROXIDE, MAGNESIUM HYDROXIDE, DIMETHICONE 15 ML: 400; 400; 40 SUSPENSION ORAL at 03:40

## 2024-08-21 RX ADMIN — REGADENOSON 0.4 MG: 0.08 INJECTION, SOLUTION INTRAVENOUS at 11:10

## 2024-08-21 RX ADMIN — NICOTINE 1 PATCH: 21 PATCH, EXTENDED RELEASE TRANSDERMAL at 22:19

## 2024-08-21 RX ADMIN — CILOSTAZOL 100 MG: 100 TABLET ORAL at 09:26

## 2024-08-21 RX ADMIN — ATORVASTATIN CALCIUM 80 MG: 80 TABLET, FILM COATED ORAL at 09:26

## 2024-08-21 NOTE — PROGRESS NOTES
MD ATTESTATION NOTE    The SARAH and I have discussed this patient's history, physical exam, and treatment plan.  I have reviewed the documentation and personally had a face to face interaction with the patient. I affirm the documentation and agree with the treatment and plan.  The attached note describes my personal findings.      I provided a substantive portion of the care of the patient.  I personally performed the physical exam in its entirety, and below are my findings.       Brief HPI: Patient mated with chest and nausea.  Patient history of CAD and past MI.    PHYSICAL EXAM  ED Triage Vitals   Temp Heart Rate Resp BP SpO2   08/20/24 2337 08/20/24 2334 08/20/24 2334 08/20/24 2334 08/20/24 2334   97.1 °F (36.2 °C) 88 18 126/87 97 %      Temp src Heart Rate Source Patient Position BP Location FiO2 (%)   08/20/24 2337 08/21/24 0324 08/21/24 0324 08/21/24 0324 --   Tympanic Monitor Lying Right arm          GENERAL: no acute distress  HENT: nares patent  EYES: no scleral icterus  CV: regular rhythm, normal rate  RESPIRATORY: normal effort  ABDOMEN: soft  MUSCULOSKELETAL: no deformity  NEURO: alert, moves all extremities, follows commands  PSYCH:  calm, cooperative  SKIN: warm, dry    Vital signs and nursing notes reviewed.        Plan: Cardiology consult.  Troponin without significant delta.  EKG nonischemic.  TTE pending.

## 2024-08-21 NOTE — PROGRESS NOTES
ED OBSERVATION PROGRESS/DISCHARGE SUMMARY    Date of Admission: 8/20/2024   LOS: 0 days   PCP: Junior Murray MD      Subjective     Hospital Outcome:     Patient is a pleasant 63-year-old  gentleman admitted to the ED observation unit with complaint of chest discomfort.  He is established with cardiologist Dr. Patel at [facility] but has not seen them in a few years.  He had negative high-sensitivity troponins overnight.    Prior chart review shows the patient was seen in the emergency room at [facility] 8/2/2024 for a fall as well as suicidal ideation, seen and evaluated by psychiatric team and [place]  Due to this a access consult was placed while patient here in the hospital.    He has a known history of insulin-dependent diabetes, tells me he has been out of his insulin for the last few months due to issues at his pharmacy.  His A1c is 7.60.  A consultation to the diabetic educator team was placed.    He was seen and evaluated by Dr. Barney with the cardiology service and underwent stress testing.  This which showed to be of intermediate risk.  Cardiology team recommends patient undergo echocardiogram and then remain n.p.o. after midnight for cardiac catheterization in the morning.  Patient is agreeable to plan    ROS:  General: no fevers, chills  Respiratory: no cough, dyspnea  Cardiovascular: no chest pain, palpitations  Abdomen: No abdominal pain, nausea, vomiting, or diarrhea  Neurologic: No focal weakness    Objective   Physical Exam:  I have reviewed the vital signs.  Temp:  [97.1 °F (36.2 °C)-97.5 °F (36.4 °C)] 97.5 °F (36.4 °C)  Heart Rate:  [67-88] 83  Resp:  [18] 18  BP: (118-142)/(79-87) 142/86  General Appearance:    Alert, cooperative, no distress, appears older than stated age  Head:    Normocephalic, atraumatic  Eyes:    Sclerae anicteric  Neck:   Supple, no mass  Lungs: Clear to auscultation bilaterally, respirations unlabored  Heart: Regular rate and  rhythm, S1 and S2 normal, no murmur, rub or gallop  Abdomen:  Soft, nontender, bowel sounds active, nondistended  Extremities: No clubbing, cyanosis, or edema to lower extremities  Pulses: Left lower extremity reassuring on exam, right below-knee amputation remotely  Skin: No rashes   Psychiatric: Flat affect, poor eye contact, nonsuicidal  Neurologic: Oriented x3, Normal strength to extremities    Results Review:    I have reviewed the labs, radiology results and diagnostic studies.    Results from last 7 days   Lab Units 08/21/24  0519   WBC 10*3/mm3 8.38   HEMOGLOBIN g/dL 13.1   HEMATOCRIT % 39.2   PLATELETS 10*3/mm3 195     Results from last 7 days   Lab Units 08/21/24 0519 08/20/24  2357   SODIUM mmol/L 136 134*   POTASSIUM mmol/L 3.9 4.6   CHLORIDE mmol/L 96* 95*   CO2 mmol/L 33.0* 31.1*   BUN mg/dL 17 20   CREATININE mg/dL 0.65* 0.66*   CALCIUM mg/dL 9.9 9.3   BILIRUBIN mg/dL 0.2 <0.2   ALK PHOS U/L 77 73   ALT (SGPT) U/L 10 12   AST (SGOT) U/L 11 13   GLUCOSE mg/dL 220* 249*     Imaging Results (Last 24 Hours)       Procedure Component Value Units Date/Time    XR Chest 1 View [[id number]] Collected: 08/21/24 0004     Updated: 08/21/24 0007    Narrative:      SINGLE VIEW OF THE CHEST     HISTORY: Chest pain     COMPARISON: None available.     FINDINGS:  Heart size is within normal limits. No pneumothorax, pleural effusion,  or acute infiltrate is seen.       Impression:      No acute findings.     This report was finalized on 8/21/2024 12:04 AM by Dr. Carolyn Alan M.D on Workstation: [id number]               I have reviewed the medications.  ---------------------------------------------------------------------------------------------  Assessment & Plan   Assessment/Problem List    Chest pain      Plan:    Chest pain  SOA  -Reports having nearly daily chest pain for about 2 years  -Episodes last 5 to 15 minutes  -History of CAD, on Pletal and Plavix  -TTE April 2020 was not remarkable  -Troponins less  than 6, flat  -EKG is nonischemic, sinus rhythm with PVC  -Continuous cardiac monitoring  -Trend EKG and troponin  -Cardiology consultation plan for cardiac catheterization in a.m.  -Stress testing intermediate risk  -Echocardiogram pending     T2DM  -Every 6 hours POC BG while n.p.o.  -Basal bolus SSI   -Hold glipizide until all diet resumes  -Diabetic educator consult placed  -Hemoglobin A1c 7.60    Disposition: To Cath Lab in a.m.    This note will serve as a progress note    Charis Soni, LENCHO 08/21/24 07:11 EDT    I have worn appropriate PPE during this patient encounter, sanitized my hands both with entering and exiting patient's room.      51 minutes has been spent by [facility] providers in the care of this patient while under observation status

## 2024-08-21 NOTE — ED PROVIDER NOTES
" EMERGENCY DEPARTMENT ENCOUNTER  Room Number:  15/15  PCP: Junior Murray MD  Independent Historians: Patient and EMS      HPI:  Chief Complaint: had concerns including Chest Pain and Shortness of Breath.     A complete HPI/ROS/PMH/PSH/SH/FH are unobtainable due to: Poor historian    Chronic or social conditions impacting patient care (Social Determinants of Health): None      Context: Ajit Brandt is a 63 y.o. male with a medical history of diabetes, coronary disease, hyperlipidemia who presents to the ED c/o acute chest pain and shortness of breath.  The patient reports that he woke up this evening around 1030 and had chest pressure and shortness of breath.  The patient reports he was diaphoretic and nauseated.  He reports that he gets chest pressure like this a lot but this was more severe.  He reports he has \"2 dead arteries in his heart.\"  He states he  from May through August.  He states that he has not been taking his medication.  He reports that he has not followed up with his doctors like he is supposed to.  He does report that he saw his cardiologist 2 weeks ago but he had a phone call and had to leave in the middle of the appointment.  EMS reports they gave him 2 nitro and aspirin.  He reports that he did have some lightheadedness when he woke up.  He states he was a little confused about where he was but that is resolved.  He reports the lightheadedness and the confusion is normal for him.      Review of prior external notes (non-ED) -and- Review of prior external test results outside of this encounter:  Cardiology note dated 3/31/2022 notes he needs to the back of his cessation.  He did not want any further cardiac testing at that time.    Prescription drug monitoring program review:         PAST MEDICAL HISTORY  Active Ambulatory Problems     Diagnosis Date Noted    Cellulitis of right lower extremity 06/10/2023     Resolved Ambulatory Problems     Diagnosis Date Noted    No Resolved Ambulatory " Problems     No Additional Past Medical History         PAST SURGICAL HISTORY  No past surgical history on file.      FAMILY HISTORY  No family history on file.      SOCIAL HISTORY  Social History     Socioeconomic History    Marital status:          ALLERGIES  Patient has no known allergies.      REVIEW OF SYSTEMS  Review of Systems  Included in HPI  All systems reviewed and negative except for those discussed in HPI.      PHYSICAL EXAM    I have reviewed the triage vital signs and nursing notes.    ED Triage Vitals   Temp Heart Rate Resp BP SpO2   08/20/24 2337 08/20/24 2334 08/20/24 2334 08/20/24 2334 08/20/24 2334   97.1 °F (36.2 °C) 88 18 126/87 97 %      Temp src Heart Rate Source Patient Position BP Location FiO2 (%)   08/20/24 2337 -- -- -- --   Tympanic           Physical Exam  GENERAL: Awake, chronically ill-appearing, alert, no acute distress  SKIN: Warm, dry  HENT: Normocephalic, atraumatic  EYES: no scleral icterus  CV: regular rhythm, regular rate  RESPIRATORY: normal effort, lungs clear  ABDOMEN: soft, nontender, nondistended  MUSCULOSKELETAL: no deformity, no calf tenderness or swelling  NEURO: alert, moves all extremities, follows commands            LAB RESULTS  Recent Results (from the past 24 hour(s))   ECG 12 Lead Chest Pain    Collection Time: 08/20/24 11:46 PM   Result Value Ref Range    QT Interval 381 ms    QTC Interval 423 ms   Comprehensive Metabolic Panel    Collection Time: 08/20/24 11:57 PM    Specimen: Blood   Result Value Ref Range    Glucose 249 (H) 65 - 99 mg/dL    BUN 20 8 - 23 mg/dL    Creatinine 0.66 (L) 0.76 - 1.27 mg/dL    Sodium 134 (L) 136 - 145 mmol/L    Potassium 4.6 3.5 - 5.2 mmol/L    Chloride 95 (L) 98 - 107 mmol/L    CO2 31.1 (H) 22.0 - 29.0 mmol/L    Calcium 9.3 8.6 - 10.5 mg/dL    Total Protein 6.3 6.0 - 8.5 g/dL    Albumin 3.6 3.5 - 5.2 g/dL    ALT (SGPT) 12 1 - 41 U/L    AST (SGOT) 13 1 - 40 U/L    Alkaline Phosphatase 73 39 - 117 U/L    Total Bilirubin <0.2  0.0 - 1.2 mg/dL    Globulin 2.7 gm/dL    A/G Ratio 1.3 g/dL    BUN/Creatinine Ratio 30.3 (H) 7.0 - 25.0    Anion Gap 7.9 5.0 - 15.0 mmol/L    eGFR 104.7 >60.0 mL/min/1.73   High Sensitivity Troponin T    Collection Time: 08/20/24 11:57 PM    Specimen: Blood   Result Value Ref Range    HS Troponin T <6 <22 ng/L   BNP    Collection Time: 08/20/24 11:57 PM    Specimen: Blood   Result Value Ref Range    proBNP 111.0 0.0 - 900.0 pg/mL   CBC Auto Differential    Collection Time: 08/20/24 11:57 PM    Specimen: Blood   Result Value Ref Range    WBC 8.07 3.40 - 10.80 10*3/mm3    RBC 4.37 4.14 - 5.80 10*6/mm3    Hemoglobin 13.1 13.0 - 17.7 g/dL    Hematocrit 39.6 37.5 - 51.0 %    MCV 90.6 79.0 - 97.0 fL    MCH 30.0 26.6 - 33.0 pg    MCHC 33.1 31.5 - 35.7 g/dL    RDW 13.7 12.3 - 15.4 %    RDW-SD 46.0 37.0 - 54.0 fl    MPV 8.8 6.0 - 12.0 fL    Platelets 222 140 - 450 10*3/mm3    Neutrophil % 56.3 42.7 - 76.0 %    Lymphocyte % 31.2 19.6 - 45.3 %    Monocyte % 9.2 5.0 - 12.0 %    Eosinophil % 2.2 0.3 - 6.2 %    Basophil % 0.7 0.0 - 1.5 %    Immature Grans % 0.4 0.0 - 0.5 %    Neutrophils, Absolute 4.54 1.70 - 7.00 10*3/mm3    Lymphocytes, Absolute 2.52 0.70 - 3.10 10*3/mm3    Monocytes, Absolute 0.74 0.10 - 0.90 10*3/mm3    Eosinophils, Absolute 0.18 0.00 - 0.40 10*3/mm3    Basophils, Absolute 0.06 0.00 - 0.20 10*3/mm3    Immature Grans, Absolute 0.03 0.00 - 0.05 10*3/mm3    nRBC 0.0 0.0 - 0.2 /100 WBC   High Sensitivity Troponin T 2Hr    Collection Time: 08/21/24  2:07 AM    Specimen: Blood   Result Value Ref Range    HS Troponin T <6 <22 ng/L    Troponin T Delta           RADIOLOGY  XR Chest 1 View    Result Date: 8/21/2024  SINGLE VIEW OF THE CHEST  HISTORY: Chest pain  COMPARISON: None available.  FINDINGS: Heart size is within normal limits. No pneumothorax, pleural effusion, or acute infiltrate is seen.      No acute findings.  This report was finalized on 8/21/2024 12:04 AM by Dr. Carolyn Alan M.D on Workstation:  [id number]         MEDICATIONS GIVEN IN ER  Medications   sodium chloride 0.9 % flush 10 mL (has no administration in time range)         ORDERS PLACED DURING THIS VISIT:  Orders Placed This Encounter   Procedures    XR Chest 1 View    Comprehensive Metabolic Panel    High Sensitivity Troponin T    BNP    CBC Auto Differential    High Sensitivity Troponin T 2Hr    ECG 12 Lead Chest Pain    Insert Peripheral IV    Initiate ED Observation Status    CBC & Differential         OUTPATIENT MEDICATION MANAGEMENT:  Current Facility-Administered Medications Ordered in Epic   Medication Dose Route Frequency Provider Last Rate Last Admin    sodium chloride 0.9 % flush 10 mL  10 mL Intravenous PRN Jelani Mao MD         Current Outpatient Medications Ordered in Epic   Medication Sig Dispense Refill    amitriptyline (ELAVIL) 25 MG tablet Take 1 tablet by mouth At Night As Needed.      atorvastatin (LIPITOR) 80 MG tablet Take 1 tablet by mouth Daily.      cyclobenzaprine (FLEXERIL) 5 MG tablet Take 1 tablet by mouth 3 (Three) Times a Day As Needed. for muscle spams      glipizide (GLUCOTROL XL) 10 MG 24 hr tablet Take 1 tablet by mouth Daily.      Tresiba FlexTouch 100 UNIT/ML solution pen-injector injection ADMINISTER 20 UNITS UNDER THE SKIN EVERY MORNING      Trulicity 1.5 MG/0.5ML solution pen-injector ADMINISTER 1.5 MG UNDER THE SKIN 1 TIME A WEEK           PROCEDURES  Procedures            PROGRESS, DATA ANALYSIS, CONSULTS, AND MEDICAL DECISION MAKING  All labs have been independently interpreted by me.  All radiology studies have been reviewed by me. All EKG's have been independently viewed and interpreted by me.  Discussion below represents my analysis of pertinent findings related to patient's condition, differential diagnosis, treatment plan and final disposition.    Differential diagnosis includes but is not limited to acute coronary syndrome, acute aortic syndrome, PE, pneumothorax, unstable angina.    Clinical  Scores: HEART Score: 4                   ED Course as of 08/21/24 0237   Tue Aug 20, 2024   2347 EKG          EKG time: 2346  Rhythm/Rate: Normal sinus, rate 74  P waves and MI: Normal P, normal MI  QRS, axis: Narrow QRS, normal axis  ST and T waves: No acute    Independently Interpreted by me  No prior EKG available for comparison   [TR]   2353 XR Chest 1 View  My independent interpretation of the imaging study is no pneumothorax [TR]   Wed Aug 21, 2024   0234 I reviewed the workup and findings with the patient at the bedside.  Answered all questions.  The way he describes his chest pain is concerning with his diaphoresis, chest pressure, shortness of breath.  He has 2 negative troponins which is surprising given how he describes his pain.  He expresses noncompliance with his medications and has known disease.  Plan admission to the observation unit for further cardiac evaluation.  He is agreeable.  He is currently sleeping comfortably. [TR]   0237 I discussed with Jt Desai with the observation unit.  She accepts for admission. [TR]      ED Course User Index  [TR] Jelani Mao MD             AS OF 02:37 EDT VITALS:    BP - 130/83  HR - 71  TEMP - 97.1 °F (36.2 °C) (Tympanic)  O2 SATS - 98%    COMPLEXITY OF CARE  The patient requires admission.      DIAGNOSIS  Final diagnoses:   Chest pain, unspecified type   Coronary artery disease involving native coronary artery of native heart, unspecified whether angina present   Uncontrolled type 2 diabetes mellitus with hyperglycemia         DISPOSITION  ED Disposition       ED Disposition   Decision to Admit    Condition   --    Comment   --                Please note that portions of this document were completed with a voice recognition program.    Note Disclaimer: At [facility], we believe that sharing information builds trust and better relationships. You are receiving this note because you recently visited [facility]. It is possible you will see [facility]  information before a provider has talked with you about it. This kind of information can be easy to misunderstand. To help you fully understand what it means for your health, we urge you to discuss this note with your provider.         Jelani Mao MD  08/21/24 1307

## 2024-08-21 NOTE — CONSULTS
"[facility]        Patient Name: Ajit Brandt  Age/Sex: 63 y.o. male  : 1960  MRN: [id number]    Date of Admission: 2024  Date of Encounter Visit: 24  Encounter Provider: Kaleb Barney MD  Referring Provider: Андрей MEADOWS MD  Place of Service: [facility] CARDIOLOGY  Patient Care Team:  Junior Murray MD as PCP - General (Internal Medicine)    Subjective:     Chief Complaint: chest pain and shortness of breath     Reason for consult: chest pain     History of Present Illness:  Ajit Brandt is a 63 y.o. male patient of [place] heart specialist with a history of diabetes, coronary artery disease,PVD, tobacco use and hyperlipidemia who presents for further evaluation of chest pain and shortness of breath.     Patient was seen in 2021 by Dr. Franklin he was supposed to have a carotid ultrasound and a nuclear scan but neither 1 was performed due to transportation issues.    Patient was last seen by [place] heart specialist on 3/30/2022 for follow-up.  He denied any chest pain or angina but is not very physically active.  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  He denied any focal motor deficits, PND or orthopnea.  Patient did not want to do any further cardiac testing at this time.  No medication changes were made.    Patient presented to the ER on 2024 with complaints of shortness of breath and a diaphoretic episode.  C Patient woke up this evening around 1030 and had chest pressure and shortness of breath with diaphoresis and was nauseated.  Patient reports he has chest pressure a lot but this was more severe.  Patient reported he has\" 2 dead arteries\" in his heart:.  Patient has not been taking his medication and has not been following up with his doctors like he is supposed to.  Hereported he saw his cardiologist 2 weeks ago had a phone call and had " to leave in the middle of the appointment.  Per EMS, he was given 2 nitro and aspirin.  Patient reported he had some lightheadedness when he woke up.  He also reported some confusion about where he was but that has resolved. He reported the lightheadedness and confusion is normal for him, he reports that his niece saw him acutely ill looking diaphoretic and pale and encouraged him to come to the ER..  In ER, , BUN 20/CR 0.66, troponin T <6, pro BNP, CXR showed nothing acute, EKG showed sinus rhythm rate of 74.     He has a prior cardiac history of a remote cardiac catheterization performed in 2006 at [facility].  The records of this are not directly available for review but there is mention of his cardiac anatomy and his prior [place] notes.  There is a 100% LAD occlusion after the first diagonal, left dominant circulation with moderate percent occlusions of the OM1 and OM 2, the PDA had 100% occlusion, and he was noted to have small vessel disease at that time.    Prior Cardiac Testing:  ECHO 4/7/20   Summary    The ejection fraction biplane was calculated at 60%    Left ventricle size is normal.    Borderline left ventricular hypertrophy.    Overall left ventricular function is normal.    The right ventricular chamber size and systolic function are within normal    limits.    Mild aortic stenosis is present.     Past Medical History:  Past Medical History:   Diagnosis Date    Coronary artery disease     Diabetes mellitus     Emphysema of lung     GERD (gastroesophageal reflux disease)     Hyperlipidemia        Past Surgical History:   Procedure Laterality Date    BELOW KNEE LEG AMPUTATION         Home Medications:   Medications Prior to Admission   Medication Sig Dispense Refill Last Dose    albuterol sulfate  (90 Base) MCG/ACT inhaler Inhale 2 puffs.   Past Month    amitriptyline (ELAVIL) 25 MG tablet Take 1 tablet by mouth At Night As Needed.   8/20/2024    atorvastatin (LIPITOR) 80 MG tablet  Take 1 tablet by mouth Daily.   8/20/2024    cilostazol (PLETAL) 100 MG tablet Take 1 tablet by mouth.   Past Month    clopidogrel (PLAVIX) 75 MG tablet Take 1 tablet by mouth Daily.   Past Month    cyclobenzaprine (FLEXERIL) 5 MG tablet Take 1 tablet by mouth 3 (Three) Times a Day As Needed. for muscle spams   8/20/2024    glipizide (GLUCOTROL XL) 10 MG 24 hr tablet Take 1 tablet by mouth Daily.   8/20/2024    lisinopril (PRINIVIL,ZESTRIL) 10 MG tablet Take 1 tablet by mouth Daily.   Past Week    sertraline (ZOLOFT) 25 MG tablet Take 1 tablet by mouth Daily.   Unknown    Tresiba FlexTouch 100 UNIT/ML solution pen-injector injection ADMINISTER 20 UNITS UNDER THE SKIN EVERY MORNING       Trulicity 1.5 MG/0.5ML solution pen-injector ADMINISTER 1.5 MG UNDER THE SKIN 1 TIME A WEEK          Allergies:  No Known Allergies    Past Social History:  Social History     Socioeconomic History    Marital status:    Tobacco Use    Smoking status: Every Day     Current packs/day: 1.00     Average packs/day: 1 pack/day for 50.0 years (50.0 ttl pk-yrs)     Types: Cigarettes     Start date: 8/21/1974     Passive exposure: Current   Vaping Use    Vaping status: Never Used   Substance and Sexual Activity    Alcohol use: Yes     Alcohol/week: 1.0 standard drink of alcohol     Types: 1 Shots of liquor per week    Drug use: Never    Sexual activity: Defer       Past Family History:  History reviewed. No pertinent family history.    REVIEW OF SYSTEMS:   14 point ROS was performed and is negative except as outlined in HPI          Objective:   Temp:  [97.1 °F (36.2 °C)-97.7 °F (36.5 °C)] 97.7 °F (36.5 °C)  Heart Rate:  [67-88] 78  Resp:  [16-18] 18  BP: ()/(64-87) 92/69   No intake or output data in the 24 hours ending 08/21/24 1437  Body mass index is 20.56 kg/m².      08/21/24  0240 08/21/24  0324   Weight: 65 kg (143 lb 4.8 oz) 65 kg (143 lb 4.8 oz)     Weight change:     General Appearance:    Alert, cooperative, in no  "acute distress   Throat:   oral mucosa moist   Neck:   supple, trachea midline, no thyromegaly, no carotid bruit, no significant JVD   Lungs:     Clear to auscultation,respirations regular, even and unlabored     Heart:    Regular rhythm and normal rate, normal S1 and S2, 3/6 JEANETTE at left sternal border murmur, no gallop, no rub, no click   Chest Wall:    No abnormalities observed   Abdomen:     nondistended, no guarding, no rebound  tenderness   Extremities:   Moves all extremities well, right BKA noted no significant edema, no cyanosis, no redness   Pulses:   Pulses palpable and equal bilaterally. Normal radial, carotid, femoral, dorsalis pedis and posterior tibial pulses bilaterally.    Skin:  Psychiatric:   No bleeding, bruising or rash    Alert and oriented x 3, normal mood and affect     Lab Review:   Results from last 7 days   Lab Units 08/21/24  0519 08/20/24  2357   SODIUM mmol/L 136 134*   POTASSIUM mmol/L 3.9 4.6   CHLORIDE mmol/L 96* 95*   CO2 mmol/L 33.0* 31.1*   BUN mg/dL 17 20   CREATININE mg/dL 0.65* 0.66*   GLUCOSE mg/dL 220* 249*   CALCIUM mg/dL 9.9 9.3   AST (SGOT) U/L 11 13   ALT (SGPT) U/L 10 12     Results from last 7 days   Lab Units 08/21/24  0519 08/21/24  0207 08/20/24  2357   HSTROP T ng/L 8 <6 <6     Results from last 7 days   Lab Units 08/21/24  0519 08/20/24  2357   WBC 10*3/mm3 8.38 8.07   HEMOGLOBIN g/dL 13.1 13.1   HEMATOCRIT % 39.2 39.6   PLATELETS 10*3/mm3 195 222             Results from last 7 days   Lab Units 08/21/24  0519   CHOLESTEROL mg/dL 114   TRIGLYCERIDES mg/dL 155*   HDL CHOL mg/dL 47     Results from last 7 days   Lab Units 08/20/24  2357   PROBNP pg/mL 111.0           Echo EF Estimated  No results found for: \"ECHOEFEST\"    EKG:                   I personally viewed and interpreted the patient's EKG.  The ECG is unremarkable for any ischemic changes.    Imaging:  Imaging Results (Most Recent)       Procedure Component Value Units Date/Time    XR Chest 1 View [[id number]] " Collected: 08/21/24 0004     Updated: 08/21/24 0007    Narrative:      SINGLE VIEW OF THE CHEST     HISTORY: Chest pain     COMPARISON: None available.     FINDINGS:  Heart size is within normal limits. No pneumothorax, pleural effusion,  or acute infiltrate is seen.       Impression:      No acute findings.     This report was finalized on 8/21/2024 12:04 AM by Dr. Carolyn Alan M.D on Workstation: [id number]                   Assessment:     Active Hospital Problems    Diagnosis  POA    **Chest pain [R07.9]  Yes      Resolved Hospital Problems   No resolved problems to display.          Plan:     Chest pain/diaphoretic episode: He reports has had the stuttering symptoms for some time but they have acutely worsened over the last few days.  Thankfully, his high-sensitivity troponins are flat and unremarkable, which rules out ACS.  However, he does have known, existing native CAD, diabetes, and numerous risk factors for for worsening coronary heart disease.  He had a stress test with rather high risk features with an apical infarct and john-infarct ischemia, which does match his prior known native 's of his LAD dominant circumflex.  His last coronary anatomy evaluation was in 2006, I do think would be worthwhile for repeat coronary angiography to delineate for any new high risk features that might change our management but I do think medical therapy is likely going to be advised as that was the recommendation after he had his cath in 2006  Thankfully, his echocardiogram shows a normal EF, but again the stress test does suggest an apical infarction  Since his troponins are flat we did discuss an outpatient evaluation, especially given his concomitant peripheral vascular disease, however he does have significant transportation issues and has missed a lot of clinic appointment secondary to that, and is minimal to proceeding with an evaluation tomorrow with coronary angiogram.  Peripheral vascular disease: He  remains on Plavix for PVD indication.  Will need to consider aspirin if he is up getting PCI  Diabetes mellitus: Per observation team.  A1c 7.6    Thank you for allowing me to participate in the care of Ajit Brandt.  We discussed the risk benefits and alternatives to coronary angiography given his diaphoretic and chest pain episode that occurred at rest could constitute high risk crescendo angina symptoms, thankfully no signs of ACS at this time.    Kaleb Barney MD  [facility]  08/21/24  07:41 EDT      Part of this note may be an electronic transcription/translation of spoken language to printed text using the Dragon Dictation System.

## 2024-08-21 NOTE — PLAN OF CARE
Goal Outcome Evaluation:   Pt admitted with chest pain. Echo normal, stress test showed apical infarct. Pt comfortable and alert and oriented. Plan to do cardiac cath in the AM

## 2024-08-21 NOTE — CONSULTS
met with pt at bedside in response to Spiritual Care Consult. Pt expressed gratitude for visit, but no spiritual care needs at this time. Chaplains remain available.

## 2024-08-21 NOTE — PLAN OF CARE
"Goal Outcome Evaluation:                 Pt admitted for worsening chest pressure and nausea. Pt states he has frequent episodes of chest pain and intermittent nausea but this pain \"feels different.\" Admits to not taking meds as prescribed due to not being able to get them from pharmacy and depression. He is a right BKA with redness and pain to the stump. No open wounds or drainage. He is alert and oriented x 4, standby assist with prosthetic and crutches and has a urinal at bedside.                              "

## 2024-08-21 NOTE — H&P
" [facility]   HISTORY AND PHYSICAL    Patient Name: Ajit Brandt  : 1960  MRN: [id number]  Primary Care Physician:  Junior Murray MD  Date of admission: 2024    Subjective   Subjective     Chief Complaint:   Chief Complaint   Patient presents with    Chest Pain    Shortness of Breath         HPI:    Ajit Brandt is a 63 y.o. male, with medical history significant for depression, MI, CAD, emphysema, T2DM, right BKA, and hyperlipidemia, presenting with chest pain that started around 2230 on 2024.  He states the pain woke him out of his sleep and he felt chest tightness, was diaphoretic and nauseated.  He reports he came to the hospital because his niece visited him and \" freaked out about his chest pain.\" He reports he feels this nearly daily since his wife passed away 2 years ago with a terminal illness.  He reports increased stress related to loss of family and property.  He reportedly had 2 heart attacks around , he states he did not know about these heart attacks until he went in for the right BKA in . He denies recent illness, fever, chills.  He denies upper respiratory symptoms.  He does report significant reflux pain that also happens daily. He states \" I just gave up after she ,\" when asked about his health management.  He admits to not always taking his medication and not following up with his physicians as he should.      His ED workup is significant for troponin less than 6, , EKG is nonischemic, sinus rhythm with PVC, chest x-ray without acute findings and blood glucose of 249 mg/dL.    Chart review reveals a 2D echo done 2020 with EF 60%, borderline LVH, mild aortic stenosis, and otherwise normal echo.  His last documented cardiology visit with Dr. Edvin Davalos was in 2022, which alluded to pain cardiac catheterization sensation in 2006 remarking only \"[facility] for positive preop stress: 100%LAD (after 1st diag), left dominant, OM1 and OM2 " "100%, left %, small nondom %, mild decrease EF; med Rx due to small coronaries.\"      Review of Systems   All systems were reviewed and negative except for: Chest pain, epigastric pain.  Dysuria      Personal History     Past Medical History:   Diagnosis Date    Coronary artery disease     Diabetes mellitus     Emphysema of lung     GERD (gastroesophageal reflux disease)     Hyperlipidemia        Past Surgical History:   Procedure Laterality Date    BELOW KNEE LEG AMPUTATION         Family History: family history is not on file. Otherwise pertinent FHx was reviewed and not pertinent to current issue.    Social History:  reports that he does not have a smoking history on file. He has been exposed to tobacco smoke. He does not have any smokeless tobacco history on file.    Home Medications:  Dulaglutide, amitriptyline, atorvastatin, cyclobenzaprine, glipizide, and insulin degludec    Allergies:  No Known Allergies    Objective   Objective     Vitals:   Temp:  [97.1 °F (36.2 °C)] 97.1 °F (36.2 °C)  Heart Rate:  [67-88] 71  Resp:  [18] 18  BP: (118-130)/(79-87) 130/83   Physical Exam:     Constitutional: Awake, alert.  Ill-appearing.   Eyes: PERRL x2, sclerae anicteric, no conjunctival injection. No EOM abnormalities.   HENT: NCAT, mucous membranes moist,   Neck: Supple, no thyromegaly, no lymphadenopathy, trachea midline  Respiratory: Clear to auscultation bilaterally, nonlabored respirations   Cardiovascular: RRR, no murmurs, rubs, or gallops, palpable pedal pulses bilaterally. No appreciable edema.   Gastrointestinal: Positive bowel sounds, soft, nontender, not distended.   Musculoskeletal: No bilateral ankle edema, no clubbing or cyanosis to extremities. No obvious deformities.   Psychiatric: Cooperative. + Poor historian, depressed, hopeless.  Neurologic: Oriented x 3, strength symmetric in all extremities. Cranial nerves grossly intact to confrontation, speech clear  Skin: No rashes, skin intact. "     Result Review    Result Review:  I have personally reviewed the results from the time of this admission to 8/21/2024 03:25 EDT and agree with these findings:  [x]  Laboratory list / accordion  []  Microbiology  [x]  Radiology  [x]  EKG/Telemetry   []  Cardiology/Vascular   []  Pathology  [x]  Old records  []  Other:  Most notable findings include:     His ED workup is significant for troponin less than 6, , EKG is nonischemic, sinus rhythm with PVC, chest x-ray without acute findings and blood glucose of 249 mg/dL.      Assessment & Plan   Assessment / Plan     Brief Patient Summary:  Ajit Brandt is a 63 y.o. male who presents with acute worsening of chronic chest pain.  He woke up with chest tightness, nausea and diaphoresis.  Troponins flat, EKG is nonischemic, sinus rhythm.  He has a history of coronary artery disease and has not followed up with his physician.    Active Hospital Problems:  Active Hospital Problems    Diagnosis     **Chest pain      Plan:     Chest pain  SOA  -Reports having nearly daily chest pain for about 2 years  -Episodes last 5 to 15 minutes  -History of CAD, on Pletal and Plavix  -TTE April 2020 was not remarkable  -Troponins less than 6, flat  -EKG is nonischemic, sinus rhythm with PVC  -Continuous cardiac monitoring  -Trend EKG and troponin  -Cardiology consultation    T2DM  -Every 6 hours POC BG while n.p.o.  -Basal bolus SSI   -Hold glipizide until all diet resumes    Depression  -Continue home Zoloft      VTE Prophylaxis:  Mechanical VTE prophylaxis orders are present.        CODE STATUS:    Medical Intervention Limits: No intubation (DNI)  Level Of Support Discussed With: Patient  Code Status (Patient has no pulse and is not breathing): CPR (Attempt to Resuscitate)  Medical Interventions (Patient has pulse or is breathing): Limited Support    Admission Status:  I believe this patient meets observation status.    Electronically signed by LENCHO Cason, 08/21/24, 3:25  AM EDT.        75 minutes has been spent by [facility] providers in the care of this patient while under observation status      I have worn appropriate PPE during this patient encounter, sanitized my hands both with entering and exiting patient's room.    I have discussed plan of care with patient including advance care plan and/or surrogate decision maker.  Patient advises that their uncle, Hill Reardon, will be their primary surrogate decision maker

## 2024-08-21 NOTE — CONSULTS
Chp attempted Spiritual Care consult; patient out of room at this time.    Consult remains open. Pastoral Care office will try again this afternoon if possible.    Chaplains remain available.

## 2024-08-22 VITALS
DIASTOLIC BLOOD PRESSURE: 66 MMHG | SYSTOLIC BLOOD PRESSURE: 102 MMHG | HEIGHT: 70 IN | BODY MASS INDEX: 20.47 KG/M2 | RESPIRATION RATE: 16 BRPM | TEMPERATURE: 97.7 F | OXYGEN SATURATION: 97 % | WEIGHT: 143 LBS | HEART RATE: 85 BPM

## 2024-08-22 LAB
ALBUMIN SERPL-MCNC: 3.8 G/DL (ref 3.5–5.2)
ALBUMIN/GLOB SERPL: 1.4 G/DL
ALP SERPL-CCNC: 69 U/L (ref 39–117)
ALT SERPL W P-5'-P-CCNC: 12 U/L (ref 1–41)
ANION GAP SERPL CALCULATED.3IONS-SCNC: 8.3 MMOL/L (ref 5–15)
AST SERPL-CCNC: 13 U/L (ref 1–40)
BILIRUB SERPL-MCNC: 0.4 MG/DL (ref 0–1.2)
BUN SERPL-MCNC: 11 MG/DL (ref 8–23)
BUN/CREAT SERPL: 15.7 (ref 7–25)
CALCIUM SPEC-SCNC: 8.9 MG/DL (ref 8.6–10.5)
CHLORIDE SERPL-SCNC: 100 MMOL/L (ref 98–107)
CO2 SERPL-SCNC: 27.7 MMOL/L (ref 22–29)
CREAT SERPL-MCNC: 0.7 MG/DL (ref 0.76–1.27)
DEPRECATED RDW RBC AUTO: 45.9 FL (ref 37–54)
EGFRCR SERPLBLD CKD-EPI 2021: 102.9 ML/MIN/1.73
ERYTHROCYTE [DISTWIDTH] IN BLOOD BY AUTOMATED COUNT: 13.9 % (ref 12.3–15.4)
GLOBULIN UR ELPH-MCNC: 2.8 GM/DL
GLUCOSE BLDC GLUCOMTR-MCNC: 104 MG/DL (ref 70–130)
GLUCOSE BLDC GLUCOMTR-MCNC: 83 MG/DL (ref 70–130)
GLUCOSE SERPL-MCNC: 92 MG/DL (ref 65–99)
HCT VFR BLD AUTO: 39.9 % (ref 37.5–51)
HGB BLD-MCNC: 13.3 G/DL (ref 13–17.7)
INR PPP: 1.01 (ref 0.9–1.1)
MCH RBC QN AUTO: 30 PG (ref 26.6–33)
MCHC RBC AUTO-ENTMCNC: 33.3 G/DL (ref 31.5–35.7)
MCV RBC AUTO: 90.1 FL (ref 79–97)
PLATELET # BLD AUTO: 188 10*3/MM3 (ref 140–450)
PMV BLD AUTO: 8.5 FL (ref 6–12)
POTASSIUM SERPL-SCNC: 3.7 MMOL/L (ref 3.5–5.2)
PROT SERPL-MCNC: 6.6 G/DL (ref 6–8.5)
PROTHROMBIN TIME: 13.5 SECONDS (ref 11.7–14.2)
RBC # BLD AUTO: 4.43 10*6/MM3 (ref 4.14–5.8)
SODIUM SERPL-SCNC: 136 MMOL/L (ref 136–145)
WBC NRBC COR # BLD AUTO: 9.09 10*3/MM3 (ref 3.4–10.8)

## 2024-08-22 PROCEDURE — 80053 COMPREHEN METABOLIC PANEL: CPT

## 2024-08-22 PROCEDURE — 96376 TX/PRO/DX INJ SAME DRUG ADON: CPT

## 2024-08-22 PROCEDURE — 82948 REAGENT STRIP/BLOOD GLUCOSE: CPT

## 2024-08-22 PROCEDURE — 85610 PROTHROMBIN TIME: CPT

## 2024-08-22 PROCEDURE — 85027 COMPLETE CBC AUTOMATED: CPT

## 2024-08-22 PROCEDURE — G0378 HOSPITAL OBSERVATION PER HR: HCPCS

## 2024-08-22 PROCEDURE — 99214 OFFICE O/P EST MOD 30 MIN: CPT

## 2024-08-22 PROCEDURE — 25810000003 SODIUM CHLORIDE 0.9 % SOLUTION

## 2024-08-22 PROCEDURE — 90791 PSYCH DIAGNOSTIC EVALUATION: CPT

## 2024-08-22 RX ADMIN — CLOPIDOGREL BISULFATE 75 MG: 75 TABLET, FILM COATED ORAL at 10:17

## 2024-08-22 RX ADMIN — SERTRALINE 25 MG: 50 TABLET, FILM COATED ORAL at 10:17

## 2024-08-22 RX ADMIN — SODIUM CHLORIDE 500 ML: 9 INJECTION, SOLUTION INTRAVENOUS at 04:53

## 2024-08-22 RX ADMIN — TAMSULOSIN HYDROCHLORIDE 0.4 MG: 0.4 CAPSULE ORAL at 10:17

## 2024-08-22 RX ADMIN — Medication 10 ML: at 10:17

## 2024-08-22 RX ADMIN — CILOSTAZOL 100 MG: 100 TABLET ORAL at 10:17

## 2024-08-22 RX ADMIN — ATORVASTATIN CALCIUM 80 MG: 80 TABLET, FILM COATED ORAL at 10:17

## 2024-08-22 RX ADMIN — PANTOPRAZOLE SODIUM 40 MG: 40 INJECTION, POWDER, FOR SOLUTION INTRAVENOUS at 10:17

## 2024-08-22 NOTE — PLAN OF CARE
Goal Outcome Evaluation:               No new concerns overnight. Pt continues to deny chest pain or soa. Applied new Nicotine patch to left shoulder. Hypotension this morning treated with a 500cc bolus of NS. Pt continues to be alert and oriented, ambulatory and npo overnight for cath lab in the morning.

## 2024-08-22 NOTE — NURSING NOTE
Patient chose to leave AMA due to being here for 37.37 hours and because his heart cath was pushed back from the originally scheduled time. I personally educated the patient on the risks of leaving vs the benefits of staying. Pt chose to leave AMA.

## 2024-08-22 NOTE — PROGRESS NOTES
LOS: 0 days   Patient Care Team:  Junior Murray MD as PCP - General (Internal Medicine)    Chief Complaint: follow up chest pain, shortness of breath     Interval History: He was resting in bed on my exam. He denies chest pain or discomfort, palpitations, or shortness of breath.     Vital Signs:  Temp:  [97.7 °F (36.5 °C)-98.8 °F (37.1 °C)] 97.7 °F (36.5 °C)  Heart Rate:  [62-88] 62  Resp:  [16-18] 16  BP: (82-97)/(55-71) 96/60  No intake or output data in the 24 hours ending 08/22/24 0847     Physical Exam  Vitals reviewed.   Constitutional:       General: He is not in acute distress.  HENT:      Head: Normocephalic.      Nose: Nose normal.   Eyes:      Extraocular Movements: Extraocular movements intact.      Pupils: Pupils are equal, round, and reactive to light.   Cardiovascular:      Rate and Rhythm: Normal rate and regular rhythm.      Pulses: Normal pulses.      Heart sounds: Heart sounds not distant. Murmur heard.      No friction rub. No gallop. No S3 or S4 sounds.   Pulmonary:      Effort: Pulmonary effort is normal.      Breath sounds: Normal breath sounds.   Abdominal:      General: Abdomen is flat. Bowel sounds are normal.      Palpations: Abdomen is soft.      Tenderness: There is no abdominal tenderness.   Musculoskeletal:      Right Lower Extremity: Right leg is amputated below knee.   Skin:     General: Skin is warm and dry.   Neurological:      General: No focal deficit present.      Mental Status: He is alert and oriented to person, place, and time. Mental status is at baseline.   Psychiatric:         Mood and Affect: Mood normal.         Behavior: Behavior normal.         Results Review:    Results from last 7 days   Lab Units 08/22/24  0506   SODIUM mmol/L 136   POTASSIUM mmol/L 3.7   CHLORIDE mmol/L 100   CO2 mmol/L 27.7   BUN mg/dL 11   CREATININE mg/dL 0.70*   GLUCOSE mg/dL 92   CALCIUM mg/dL 8.9     Results from last 7 days   Lab Units 08/21/24  0519 08/21/24  0207 08/20/24  2357   HSTROP  T ng/L 8 <6 <6     Results from last 7 days   Lab Units 08/22/24  0506   WBC 10*3/mm3 9.09   HEMOGLOBIN g/dL 13.3   HEMATOCRIT % 39.9   PLATELETS 10*3/mm3 188     Results from last 7 days   Lab Units 08/22/24  0506   INR  1.01     Results from last 7 days   Lab Units 08/21/24  0519   CHOLESTEROL mg/dL 114         Results from last 7 days   Lab Units 08/21/24  0519   CHOLESTEROL mg/dL 114   TRIGLYCERIDES mg/dL 155*   HDL CHOL mg/dL 47   LDL CHOL mg/dL 41       I reviewed the patient's new clinical results.        Assessment & Plan:  Chest pain with associated diaphoresis   HS troponin flat  He has known native CAD (last coronary anatomy evaluation was in 2006)  Known native chronic total occlusions of his LAD, dominant circumflex   Echocardiogram showed a normal ejection fraction  Stress test with high risk features  Apical infarct and john-infarct ischemia  This does match his known CAD  Given his risk factors for worsening coronary heart disease plan to proceed with coronary angiogram today.   Peripheral vascular disease  He is on Plavix  Diabetes mellitus   HgbA1c 7.6    Heidy Main, LENCHO  08/22/24  08:47 EDT

## 2024-08-22 NOTE — PROGRESS NOTES
MD ATTESTATION NOTE    The SARAH and I have discussed this patient's history, physical exam, and treatment plan.  I have reviewed the documentation and personally had a face to face interaction with the patient. I affirm the documentation and agree with the treatment and plan.  The attached note describes my personal findings.      I provided a substantive portion of the care of the patient.  I personally performed the physical exam in its entirety, and below are my findings.        Brief HPI: This patient is a 63-year-old male with a history of coronary artery disease admitted to the observation unit for further evaluation of chest discomfort.  Currently, he does report some chest discomfort however very mild in intensity.  The patient reports that he is a tobacco smoker and has no intention of quitting.  His UDS was also positive for Cocaine, fentanyl, as well as THC.      PHYSICAL EXAM  ED Triage Vitals   Temp Heart Rate Resp BP SpO2   08/20/24 2337 08/20/24 2334 08/20/24 2334 08/20/24 2334 08/20/24 2334   97.1 °F (36.2 °C) 88 18 126/87 97 %      Temp src Heart Rate Source Patient Position BP Location FiO2 (%)   08/20/24 2337 08/21/24 0324 08/21/24 0324 08/21/24 0324 --   Tympanic Monitor Lying Right arm          GENERAL: Resting comfortably and in no acute distress, nontoxic in appearance  HENT: nares patent  EYES: no scleral icterus  CV: regular rhythm, normal rate, no M/R/G  RESPIRATORY: normal effort, lungs clear bilaterally  ABDOMEN: soft, nontender, no rebound or guarding  MUSCULOSKELETAL: no deformity, no edema  NEURO: alert, moves all extremities, follows commands  PSYCH:  calm, cooperative  SKIN: warm, dry    Vital signs and nursing notes reviewed.        Plan: The patient's EKG is nonischemic and serial cardiac enzymes negative.  As mentioned before, his UDS is polysubstance positive.  Access is seen the patient in consultation and provided recommendations.  He also had a positive stress test today.   Cardiology has seen the patient in consultation and will take him for cardiac cath this morning.  Further planning to follow.

## 2024-08-22 NOTE — PROGRESS NOTES
ED OBSERVATION PROGRESS/DISCHARGE SUMMARY    Date of Admission: 8/20/2024   LOS: 0 days   PCP: Junior Murray MD    Final Diagnosis Chest pain      Subjective     Hospital Outcome:     Patient is a pleasant 63-year-old  gentleman admitted to the ED observation unit with complaint of chest discomfort.  He is established with cardiologist Dr. Patel at [place] but has not seen them in a few years.  He had negative high-sensitivity troponins overnight.     Prior chart review shows the patient was seen in the emergency room at [facility] 8/2/2024 for a fall as well as suicidal ideation, seen and evaluated by psychiatric team and [place]  Due to this a access consult was placed while patient here in the hospital.     He has a known history of insulin-dependent diabetes, tells me he has been out of his insulin for the last few months due to issues at his pharmacy.  His A1c is 7.60.  A consultation to the diabetic educator team was placed.     He was seen and evaluated by Dr. Barney with the cardiology service and underwent stress testing.  This which showed to be of intermediate risk.  Cardiology team recommends patient undergo echocardiogram and then remain n.p.o. after midnight for cardiac catheterization in the morning.  Patient is agreeable to plan    8/22/2024  Hypotensive with BP 82/62.  Heart rate of 73, denies dizziness, syncope.  Hold lisinopril until hypotension resolves, 500 mL normal saline bolus, BP improved to 96/60.  TTE calculated EF to be 56 to 60%, there is mildly calcified bicuspid aortic valve. Resting in bed, no distress, chest pain unchanged, no distress, denies dyspnea. Cardiology following plans for cardiac catheterization today.     Review of Systems:   Constitutional:  No weight changes, fever, or chills. No night sweats, no fatigue, no malaise.    ENT/Mouth:  No hearing changes, no ear pain, no nasal congestion, no sinus pain, no hoarseness, no sore  throat, no rhinorrhea, no dysphagia.   Eyes:  No eye pain, swelling, or redness. No foreign body, discharge. No vision changes.    Cardiovascular:  No chest pain, no shortness of air, no dyspnea on exertion, no orthopnea, no palpitations, no edema.      Respiratory:  No cough, no smoke exposure, no dyspnea.    Gastrointestinal:  No nausea, vomiting, or diarrhea. No constipation, or GI discomfort. No reflux pain, no anorexia, no dysphagia. No hematochezia or melena.    Genitourinary:  No dyspareunia, no dysuria, no urinary frequency. No hematuria, no urinary incontinence. No flank pain or urinary flow changes.   Musculoskeletal:  No arthralgias, no myalgias, no joint swelling or stiffness. No back or neck pain, no recent injuries.    Skin:  No skin lesions, no pruritus, no hair changes.    Neuro:  No weakness, no numbness, no paresthesias, no loss of consciousness, no syncope, no dizziness, no headache.   Psych:  No anxiety/panic, no depression, no insomnia, no personality changes, no delusions.    Heme/Lymph:  No bruising, or bleeding. No transfusions history, no lymphadenopathy.   Endocrine:  No polyuria, polydipsia, no temperature intolerances.     Objective   Physical Exam:   Constitutional: Awake, alert. Chronically ill appearing. Nontoxic appearing.   Eyes: PERRL x2, sclerae anicteric, no conjunctival injection. No EOM abnormlaities noted.   HENT: NCAT, mucous membranes moist,   Neck: Supple, no thyromegaly, no lymphadenopathy, trachea midline  Respiratory: Clear to auscultation bilaterally, nonlabored respirations   Cardiovascular: RRR, no murmurs, rubs, or gallops, palpable pedal pulses bilaterally. No appreciable edema.   Gastrointestinal: Positive bowel sounds, soft, nontender, not distended.   Musculoskeletal: No bilateral ankle edema, no clubbing or cyanosis to extremities. No obvious deformities.   Psychiatric: Appropriate affect, cooperative. Converses appropriately for age.   Neurologic: Oriented x 3,  strength symmetric in all extremities. Cranial nerves grossly intact to confrontation, speech clear  Skin: No rashes, skin intact.     Results Review:    I have reviewed the labs, radiology results and diagnostic studies.    Results from last 7 days   Lab Units 08/21/24  0519   WBC 10*3/mm3 8.38   HEMOGLOBIN g/dL 13.1   HEMATOCRIT % 39.2   PLATELETS 10*3/mm3 195     Results from last 7 days   Lab Units 08/21/24  0519 08/20/24  2357   SODIUM mmol/L 136 134*   POTASSIUM mmol/L 3.9 4.6   CHLORIDE mmol/L 96* 95*   CO2 mmol/L 33.0* 31.1*   BUN mg/dL 17 20   CREATININE mg/dL 0.65* 0.66*   CALCIUM mg/dL 9.9 9.3   BILIRUBIN mg/dL 0.2 <0.2   ALK PHOS U/L 77 73   ALT (SGPT) U/L 10 12   AST (SGOT) U/L 11 13   GLUCOSE mg/dL 220* 249*     Imaging Results (Last 24 Hours)       ** No results found for the last 24 hours. **            I have reviewed the medications.  ---------------------------------------------------------------------------------------------  Assessment & Plan   Assessment/Problem List    Chest pain      Plan:    Chest pain  SOA  -Reports having nearly daily chest pain for about 2 years  -Episodes last 5 to 15 minutes  -History of CAD, on Pletal and Plavix  -TTE April 2020 was not remarkable  -Troponins less than 6, flat  -EKG is nonischemic, sinus rhythm with PVC  -Continuous cardiac monitoring  -Trend EKG and troponin  -Cardiology consultation plan for cardiac catheterization today  -Stress testing intermediate risk  -Echocardiogram EF 56 to 60%, bicuspid aortic valve with mild calcification and stenosis.  -N.p.o. for Cath Lab  -Patient refusing to stay any longer, left AMA      T2DM  -Every 6 hours POC BG while n.p.o.  -Basal bolus SSI   -Hold glipizide until all diet resumes  -Diabetic educator consult placed  -Hemoglobin A1c 7.60    Hypotension  -BP 82/62, heart rate 73  -1x 500 mL bolus improved to 96/60  -Hold lisinopril    Polysubstance abuse  Depression  -Lost his spouse and brother in the last 2  years  -Feels immense pressure due to his living situation  -UDS positive for THC, fentanyl, cocaine  -Restart Zoloft  -Behavioral health consult      Disposition: AMA    Follow-up after Discharge: PCP, cardiology     This note will serve as a discharge note. Patient left AGAINST MEDICAL ADVICE    Jt Desai, LENCHO 08/22/24 00:10 EDT    I have worn appropriate PPE during this patient encounter, sanitized my hands both with entering and exiting patient's room.      38 minutes has been spent by [facility] providers in the care of this patient while under observation status
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
